# Patient Record
Sex: MALE | Race: BLACK OR AFRICAN AMERICAN | NOT HISPANIC OR LATINO | Employment: UNEMPLOYED | URBAN - METROPOLITAN AREA
[De-identification: names, ages, dates, MRNs, and addresses within clinical notes are randomized per-mention and may not be internally consistent; named-entity substitution may affect disease eponyms.]

---

## 2020-08-10 ENCOUNTER — OFFICE VISIT (OUTPATIENT)
Dept: INTERNAL MEDICINE | Facility: CLINIC | Age: 40
End: 2020-08-10
Payer: MEDICAID

## 2020-08-10 VITALS
DIASTOLIC BLOOD PRESSURE: 80 MMHG | HEIGHT: 72 IN | HEART RATE: 73 BPM | BODY MASS INDEX: 31.49 KG/M2 | OXYGEN SATURATION: 97 % | TEMPERATURE: 97 F | SYSTOLIC BLOOD PRESSURE: 123 MMHG | WEIGHT: 232.5 LBS

## 2020-08-10 DIAGNOSIS — Z00.00 LABORATORY EXAMINATION ORDERED AS PART OF A ROUTINE GENERAL MEDICAL EXAMINATION: ICD-10-CM

## 2020-08-10 DIAGNOSIS — F41.9 ANXIETY AND DEPRESSION: ICD-10-CM

## 2020-08-10 DIAGNOSIS — Z76.89 ENCOUNTER TO ESTABLISH CARE: Primary | ICD-10-CM

## 2020-08-10 DIAGNOSIS — F32.A ANXIETY AND DEPRESSION: ICD-10-CM

## 2020-08-10 LAB
ABS NRBC COUNT: 0 X 10 3/UL (ref 0–0.01)
ABSOLUTE BASOPHIL: 0.08 X 10 3/UL (ref 0–0.22)
ABSOLUTE EOSINOPHIL: 0.16 X 10 3/UL (ref 0.04–0.54)
ABSOLUTE LYMPHOCYTE: 4.03 X 10 3/UL (ref 0.86–4.75)
ABSOLUTE MONOCYTE: 1.13 X 10 3/UL (ref 0.22–1.08)
ALBUMIN SERPL-MCNC: 4.6 G/DL (ref 3.5–5.2)
ALBUMIN/GLOB SERPL ELPH: 1.7 {RATIO} (ref 1–2.7)
ALP ISOS SERPL LEV INH-CCNC: 28 U/L (ref 40–130)
ALT (SGPT): 26 U/L (ref 0–41)
ANION GAP SERPL CALC-SCNC: 9 MMOL/L (ref 8–17)
AST SERPL-CCNC: 20 U/L (ref 0–40)
BANDS: 1 % (ref 1–5)
BASOPHILS NFR BLD: 1 % (ref 0–1)
BILIRUBIN, TOTAL: 0.28 MG/DL (ref 0–1.2)
BUN/CREAT SERPL: 11.9 (ref 6–20)
CALCIUM SERPL-MCNC: 9.3 MG/DL (ref 8.6–10.2)
CARBON DIOXIDE, CO2: 26 MMOL/L (ref 22–29)
CHLORIDE: 105 MMOL/L (ref 98–107)
CHOLEST SERPL-MSCNC: 194 MG/DL (ref 100–200)
CREAT SERPL-MCNC: 1.04 MG/DL (ref 0.7–1.2)
EOSINOPHIL NFR BLD: 2 % (ref 1–7)
GFR ESTIMATION: 79.93
GLOBULIN: 2.7 G/DL (ref 1.5–4.5)
GLUCOSE: 99 MG/DL (ref 74–106)
HCT VFR BLD AUTO: 45.1 % (ref 42–52)
HDLC SERPL-MCNC: 48 MG/DL
HGB BLD-MCNC: 14.5 G/DL (ref 14–18)
LDL/HDL RATIO: 2.2 (ref 1–3)
LDLC SERPL CALC-MCNC: 106.8 MG/DL (ref 0–100)
LYMPHOCYTES NFR BLD: 46 % (ref 19–53)
MCH RBC QN AUTO: 32.1 PG (ref 27–32)
MCHC RBC AUTO-ENTMCNC: 32.2 G/DL (ref 32–36)
MCV RBC AUTO: 99.8 FL (ref 80–94)
MONOCYTES NFR BLD: 14 % (ref 5–13)
NEUTROPHILS ABSOLUTE COUNT: 2.66 X 10 3/UL (ref 2.32–6.7)
NUCLEATED RED BLOOD CELLS: 0 /100 WBC (ref 0–0.2)
PLATELET # BLD AUTO: 210 X 10 3/UL (ref 135–400)
POTASSIUM: 4.5 MMOL/L (ref 3.5–5.1)
PROT SNV-MCNC: 7.3 G/DL (ref 6.4–8.3)
RBC # BLD AUTO: 4.52 X 10 6/UL (ref 4.7–6.1)
RBC & PLT MORPHOLOGY: ABNORMAL
RDW-SD: 49.7 FL (ref 37–54)
REACTIVE LYMPHOCYTES: 4 %
SEGMENTERS: 32 % (ref 34–71)
SODIUM: 140 MMOL/L (ref 136–145)
TOTAL GRANULOCYTES: 2.9 X 10 3/UL
TRIGL SERPL-MCNC: 196 MG/DL (ref 0–150)
TSH W/REFLEX TO FT4: 2.54 UIU/ML (ref 0.27–4.2)
UREA NITROGEN (BUN): 12.4 MG/DL (ref 6–20)
WBC # BLD: 8.06 X 10 3/UL (ref 4.3–10.8)

## 2020-08-10 PROCEDURE — 99203 PR OFFICE/OUTPT VISIT, NEW, LEVL III, 30-44 MIN: ICD-10-PCS | Mod: S$GLB,,, | Performed by: NURSE PRACTITIONER

## 2020-08-10 PROCEDURE — 99203 OFFICE O/P NEW LOW 30 MIN: CPT | Mod: S$GLB,,, | Performed by: NURSE PRACTITIONER

## 2020-08-10 RX ORDER — BUPROPION HYDROCHLORIDE 150 MG/1
150 TABLET ORAL DAILY
Qty: 30 TABLET | Refills: 5 | Status: SHIPPED | OUTPATIENT
Start: 2020-08-10 | End: 2020-10-05 | Stop reason: DRUGHIGH

## 2020-08-10 NOTE — PROGRESS NOTES
Clinic Note  8/10/2020      Subjective:       Patient ID:  Luis is a 39 y.o. male being seen for a new visit.      Chief Complaint: Establish Care    HPI  Ra is a 39 year old male in clinic to  Establish care, has not ever followed PCP. Needs routine labs. States that he has dealt with anxiety and depression for years, seems to have increased in frequency and intensity. Never taken medication, no counseling. Denies any other health concerns.  Depression  Patient complains of depression. He complains of depressed mood, difficulty concentrating and insomnia. Onset was approximately several months ago. Symptoms have been gradually worsening since that time. Current symptoms include: insomnia. Patient denies suicidal attempt, suicidal thoughts with specific plan and suicidal thoughts without plan. Family history significant for unknown. Possible organic causes contributing are: endocrine/metabolic, nutritional. Risk factors: none. Previous treatment includes none. He complains of the following side effects from the treatment: N/A.    The following portions of the patient's history were reviewed and updated as appropriate: allergies, current medications, past family history, past medical history, past social history, past surgical history and problem list.      Family History   Problem Relation Age of Onset    Hypertension Mother     Hypertension Father      Social History     Socioeconomic History    Marital status:      Spouse name: Not on file    Number of children: Not on file    Years of education: Not on file    Highest education level: Not on file   Occupational History    Not on file   Social Needs    Financial resource strain: Not on file    Food insecurity     Worry: Not on file     Inability: Not on file    Transportation needs     Medical: Not on file     Non-medical: Not on file   Tobacco Use    Smoking status: Current Some Day Smoker    Smokeless tobacco: Never Used   Substance and  Sexual Activity    Alcohol use: Yes     Frequency: Never     Drinks per session: 1 or 2     Binge frequency: Never    Drug use: Never    Sexual activity: Not on file   Lifestyle    Physical activity     Days per week: Not on file     Minutes per session: Not on file    Stress: Not on file   Relationships    Social connections     Talks on phone: Not on file     Gets together: Not on file     Attends Sabianism service: Not on file     Active member of club or organization: Not on file     Attends meetings of clubs or organizations: Not on file     Relationship status: Not on file   Other Topics Concern    Not on file   Social History Narrative    Not on file     Past Surgical History:   Procedure Laterality Date    WISDOM TOOTH EXTRACTION       There is no problem list on file for this patient.      Review of Systems   Constitutional: Negative for chills, fever, malaise/fatigue and weight loss.   HENT: Negative for congestion, ear pain, sore throat and tinnitus.    Eyes: Negative for blurred vision, double vision, pain and discharge.   Respiratory: Negative for cough, sputum production, shortness of breath and wheezing.    Cardiovascular: Negative for chest pain, palpitations and leg swelling.   Gastrointestinal: Positive for heartburn. Negative for abdominal pain, constipation, diarrhea and vomiting.   Genitourinary: Negative for dysuria, frequency, hematuria and urgency.   Musculoskeletal: Negative for back pain, joint pain, myalgias and neck pain.   Neurological: Negative for dizziness, seizures, weakness and headaches.   Psychiatric/Behavioral: Positive for depression. Negative for suicidal ideas. The patient is nervous/anxious and has insomnia.              Objective:      /80 (BP Location: Left arm, Patient Position: Sitting, BP Method: Large (Automatic))   Pulse 73   Temp 96.8 °F (36 °C)   Ht 6' (1.829 m)   Wt 105.5 kg (232 lb 8 oz)   SpO2 97%   BMI 31.53 kg/m²   Estimated body mass index  is 31.53 kg/m² as calculated from the following:    Height as of this encounter: 6' (1.829 m).    Weight as of this encounter: 105.5 kg (232 lb 8 oz).  Physical Exam   Constitutional: He is oriented to person, place, and time and well-developed, well-nourished, and in no distress. Vital signs are normal. He appears not dehydrated. He appears healthy. He does not have a sickly appearance. No distress.   HENT:   Head: Normocephalic and atraumatic.   Right Ear: Hearing, tympanic membrane, external ear and ear canal normal.   Left Ear: Hearing and external ear normal. Tympanic membrane is perforated and erythematous.   Nose: Nose normal.   Mouth/Throat: Uvula is midline, oropharynx is clear and moist and mucous membranes are normal. Mucous membranes are not pale, not dry and not cyanotic. No oropharyngeal exudate or posterior oropharyngeal edema.   Eyes: Conjunctivae are normal. Right eye exhibits no discharge. Left eye exhibits no discharge. No scleral icterus.   Neck: Normal range of motion. Neck supple. No JVD present. No tracheal deviation present.   Cardiovascular: Normal rate, regular rhythm, S1 normal, S2 normal and normal heart sounds. Exam reveals no gallop, no distant heart sounds and no friction rub.   No murmur heard.  Pulmonary/Chest: Effort normal and breath sounds normal. No accessory muscle usage. No respiratory distress. He has no decreased breath sounds. He has no wheezes. He has no rhonchi.   Abdominal: Soft. Bowel sounds are normal. He exhibits no distension. There is no abdominal tenderness. There is no rebound and no guarding.   Musculoskeletal: Normal range of motion.         General: No deformity or edema.   Neurological: He is alert and oriented to person, place, and time. Gait normal. GCS score is 15.   Skin: Skin is warm, dry and intact. He is not diaphoretic. No pallor.   Psychiatric: Mood, memory, affect and judgment normal. His mood appears not anxious. His affect is not blunt and not  inappropriate. He is not agitated. He does not exhibit a depressed mood. He expresses no homicidal and no suicidal ideation. He expresses no suicidal plans and no homicidal plans. He exhibits ordered thought content and normal new learning ability.   Nursing note and vitals reviewed.        Mental Status Examination:  Posture and motor behavior: Appropriate  Dress, grooming, personal hygiene: Appropriate  Facial expression: Appropriate  Speech: Appropriate  Mood: Appropriate  Coherency and relevance of thought: Appropriate  Thought content: Appropriate  Perceptions: Appropriate  Orientation:Appropriate  Attention and concentration: Appropriate  Memory: : Appropriate  Information: Not examined  Vocabulary: Appropriate  Abstract reasoning: Appropriate  Judgment: Appropriate      Suicide Risk Assessment:  Suicidal intent:none  Suicidal plan: none  Access to means for suicide: unknown  Lethality of means for suicide: unknown  Prior suicide attempts: none  Recent exposure to suicide: none          Assessment and Plan:   Establish care  Routine lab, will call with results  Anxiety depression, chronic  Wellbutrin  mg daily  Follow up in 8 weeks to reassess medication and as needed      Problem List Items Addressed This Visit     None      Visit Diagnoses     Encounter to establish care    -  Primary    Laboratory examination ordered as part of a routine general medical examination        Relevant Orders    CBC auto differential    Comprehensive metabolic panel    Lipid Panel    TSH w/reflex to FT4    Anxiety and depression        Relevant Medications    buPROPion (WELLBUTRIN XL) 150 MG TB24 tablet          Risks, benefits, and alternatives discussed with patient, Patient verbalized understanding of discussed plan of care. Asked patient if any further questions, answered no.  Follow up:   No follow-ups on file.     Other Orders Placed This Visit:  Orders Placed This Encounter   Procedures    CBC auto differential      Standing Status:   Future     Number of Occurrences:   1     Standing Expiration Date:   10/9/2021    Comprehensive metabolic panel     Standing Status:   Future     Number of Occurrences:   1     Standing Expiration Date:   10/9/2021    Lipid Panel     Standing Status:   Future     Number of Occurrences:   1     Standing Expiration Date:   10/10/2021    TSH w/reflex to FT4     Standing Status:   Future     Number of Occurrences:   1     Standing Expiration Date:   10/9/2021           Manuela Toledo    Problem List Items Addressed This Visit     None      Visit Diagnoses     Encounter to establish care    -  Primary    Laboratory examination ordered as part of a routine general medical examination        Relevant Orders    CBC auto differential    Comprehensive metabolic panel    Lipid Panel    TSH w/reflex to FT4    Anxiety and depression        Relevant Medications    buPROPion (WELLBUTRIN XL) 150 MG TB24 tablet

## 2020-10-05 ENCOUNTER — OFFICE VISIT (OUTPATIENT)
Dept: PRIMARY CARE CLINIC | Facility: CLINIC | Age: 40
End: 2020-10-05
Payer: MEDICAID

## 2020-10-05 VITALS
HEART RATE: 98 BPM | HEIGHT: 72 IN | BODY MASS INDEX: 31.63 KG/M2 | DIASTOLIC BLOOD PRESSURE: 79 MMHG | SYSTOLIC BLOOD PRESSURE: 120 MMHG | WEIGHT: 233.5 LBS | OXYGEN SATURATION: 97 % | TEMPERATURE: 98 F

## 2020-10-05 DIAGNOSIS — F32.A DEPRESSION, UNSPECIFIED DEPRESSION TYPE: Primary | ICD-10-CM

## 2020-10-05 DIAGNOSIS — J06.9 URI WITH COUGH AND CONGESTION: ICD-10-CM

## 2020-10-05 PROCEDURE — 99213 PR OFFICE/OUTPT VISIT, EST, LEVL III, 20-29 MIN: ICD-10-PCS | Mod: S$GLB,,, | Performed by: NURSE PRACTITIONER

## 2020-10-05 PROCEDURE — 99213 OFFICE O/P EST LOW 20 MIN: CPT | Mod: S$GLB,,, | Performed by: NURSE PRACTITIONER

## 2020-10-05 RX ORDER — METHYLPREDNISOLONE 4 MG/1
TABLET ORAL
Qty: 1 PACKAGE | Refills: 0 | Status: SHIPPED | OUTPATIENT
Start: 2020-10-05 | End: 2020-10-26

## 2020-10-05 RX ORDER — AZITHROMYCIN 250 MG/1
TABLET, FILM COATED ORAL
Qty: 6 TABLET | Refills: 0 | Status: SHIPPED | OUTPATIENT
Start: 2020-10-05 | End: 2020-10-10

## 2020-10-05 RX ORDER — PROMETHAZINE HYDROCHLORIDE AND DEXTROMETHORPHAN HYDROBROMIDE 6.25; 15 MG/5ML; MG/5ML
5 SYRUP ORAL EVERY 8 HOURS PRN
Qty: 180 ML | Refills: 0 | Status: SHIPPED | OUTPATIENT
Start: 2020-10-05 | End: 2020-10-15

## 2020-10-05 RX ORDER — BUPROPION HYDROCHLORIDE 300 MG/1
300 TABLET ORAL DAILY
Qty: 30 TABLET | Refills: 11 | Status: SHIPPED | OUTPATIENT
Start: 2020-10-05 | End: 2022-04-12

## 2020-10-05 NOTE — PROGRESS NOTES
Clinic Note  10/5/2020      Subjective:       Patient ID:  Luis is a 39 y.o. male being seen for an established visit.    Chief Complaint: Follow-up (change medication dose) and Allergies    HPI   Ra is a 39 year old male in clinic for follow up. He states that he feels as though he could benefit from increasing his wellbutrin, states he experienced an anxiety attack that lasted for 24 hours.   Complains of sinus drainage with cough and congestion x 2weeks, tried OTC cough and cold medications with not results.  Depression with Anxiety  Patient complains of depression and anxiety. He complains of depressed mood, difficulty concentrating and insomnia. Onset was approximately several months ago. Symptoms have been gradually worsening since that time. Current symptoms include: insomnia. Patient denies suicidal attempt, suicidal thoughts with specific plan and suicidal thoughts without plan. Family history significant for unknown. Possible organic causes contributing are: endocrine/metabolic, nutritional. Risk factors: none. Previous treatment includes none. He complains of the following side effects from the treatment: N/A.     The following portions of the patient's history were reviewed and updated as appropriate: allergies, current medications, past family history, past medical history, past social history, past surgical history and problem list.    Review of Systems   Constitutional: Negative for chills and fever.   HENT: Positive for congestion. Negative for sore throat.    Respiratory: Positive for cough and sputum production. Negative for shortness of breath and wheezing.    Cardiovascular: Negative for chest pain and palpitations.   Gastrointestinal: Negative for constipation, diarrhea, nausea and vomiting.   Genitourinary: Negative for dysuria, frequency, hematuria and urgency.   Psychiatric/Behavioral: Positive for depression. The patient is nervous/anxious.        Medication List with Changes/Refills    New Medications    AZITHROMYCIN (Z-MAX) 250 MG TABLET    Take 2 tablets by mouth on day 1; Take 1 tablet by mouth on days 2-5    BUPROPION (WELLBUTRIN XL) 300 MG 24 HR TABLET    Take 1 tablet (300 mg total) by mouth once daily.    METHYLPREDNISOLONE (MEDROL DOSEPACK) 4 MG TABLET    use as directed    PROMETHAZINE-DEXTROMETHORPHAN (PROMETHAZINE-DM) 6.25-15 MG/5 ML SYRP    Take 5 mLs by mouth every 8 (eight) hours as needed.   Discontinued Medications    BUPROPION (WELLBUTRIN XL) 150 MG TB24 TABLET    Take 1 tablet (150 mg total) by mouth once daily.       There is no problem list on file for this patient.          Objective:      /79 (BP Location: Right arm, Patient Position: Sitting, BP Method: Medium (Manual))   Pulse 98   Temp 98.2 °F (36.8 °C)   Ht 6' (1.829 m)   Wt 105.9 kg (233 lb 8 oz)   SpO2 97%   BMI 31.67 kg/m²   Estimated body mass index is 31.67 kg/m² as calculated from the following:    Height as of this encounter: 6' (1.829 m).    Weight as of this encounter: 105.9 kg (233 lb 8 oz).  Physical Exam   Constitutional: He is oriented to person, place, and time and well-developed, well-nourished, and in no distress. No distress.   HENT:   Head: Normocephalic and atraumatic.   Mouth/Throat: No oropharyngeal exudate.   Cardiovascular: Normal rate, regular rhythm and normal heart sounds.   No murmur heard.  Pulmonary/Chest: Effort normal and breath sounds normal. No respiratory distress. He has no wheezes. He has no rales.   Neurological: He is alert and oriented to person, place, and time. Gait normal. GCS score is 15.   Skin: He is not diaphoretic.   Psychiatric: Mood, memory, affect and judgment normal. His mood appears not anxious. He does not exhibit a depressed mood. He expresses no homicidal and no suicidal ideation. He expresses no suicidal plans.   Nursing note and vitals reviewed.        Assessment and Plan:   URI, acute  Medrol pack  zpack if needed  Promethazine dm as  prescribed  Depression, anxiety  Increase wellbutrin xl to 300 mg  Follow up in 1 year and as needed      Problem List Items Addressed This Visit     None      Visit Diagnoses     URI with cough and congestion    -  Primary    Relevant Medications    methylPREDNISolone (MEDROL DOSEPACK) 4 mg tablet    azithromycin (Z-MAX) 250 MG tablet    promethazine-dextromethorphan (PROMETHAZINE-DM) 6.25-15 mg/5 mL Syrp    Depression, unspecified depression type        Relevant Medications    buPROPion (WELLBUTRIN XL) 300 MG 24 hr tablet            Risks, benefits, and alternatives discussed with patient, Patient verbalized understanding of discussed plan of care. Asked patient if any further questions, answered no.  Follow Up:   No follow-ups on file.    Other Orders Placed This Visit:  No orders of the defined types were placed in this encounter.        Manuela Toledo    Problem List Items Addressed This Visit     None      Visit Diagnoses     URI with cough and congestion    -  Primary    Relevant Medications    methylPREDNISolone (MEDROL DOSEPACK) 4 mg tablet    azithromycin (Z-MAX) 250 MG tablet    promethazine-dextromethorphan (PROMETHAZINE-DM) 6.25-15 mg/5 mL Syrp    Depression, unspecified depression type        Relevant Medications    buPROPion (WELLBUTRIN XL) 300 MG 24 hr tablet

## 2020-10-29 ENCOUNTER — TELEPHONE (OUTPATIENT)
Dept: FAMILY MEDICINE | Facility: CLINIC | Age: 40
End: 2020-10-29

## 2020-10-29 NOTE — TELEPHONE ENCOUNTER
----- Message from Candis Palma sent at 10/26/2020  2:13 PM CDT -----  Type:  Needs Medical Advice    Who Called: pt   Symptoms (please be specific): right elbow pain  heart burn   How long has patient had these symptoms:  today   Pharmacy name and phone #:    Would the patient rather a call back or a response via [facility]? Callback   Best Call Back Number: [phone]   Additional Information:

## 2020-11-03 ENCOUNTER — TELEPHONE (OUTPATIENT)
Dept: FAMILY MEDICINE | Facility: CLINIC | Age: 40
End: 2020-11-03

## 2020-11-09 ENCOUNTER — OFFICE VISIT (OUTPATIENT)
Dept: FAMILY MEDICINE | Facility: CLINIC | Age: 40
End: 2020-11-09
Payer: MEDICAID

## 2020-11-09 VITALS
TEMPERATURE: 98 F | BODY MASS INDEX: 32.05 KG/M2 | SYSTOLIC BLOOD PRESSURE: 129 MMHG | RESPIRATION RATE: 16 BRPM | OXYGEN SATURATION: 96 % | HEART RATE: 71 BPM | DIASTOLIC BLOOD PRESSURE: 83 MMHG | WEIGHT: 236.63 LBS | HEIGHT: 72 IN

## 2020-11-09 DIAGNOSIS — K21.9 GASTROESOPHAGEAL REFLUX DISEASE WITHOUT ESOPHAGITIS: ICD-10-CM

## 2020-11-09 DIAGNOSIS — M70.21 OLECRANON BURSITIS OF RIGHT ELBOW: Primary | ICD-10-CM

## 2020-11-09 PROCEDURE — 20605 PR DRAIN/INJECT INTERMEDIATE JOINT/BURSA: ICD-10-PCS | Mod: RT,S$GLB,, | Performed by: NURSE PRACTITIONER

## 2020-11-09 PROCEDURE — 99214 OFFICE O/P EST MOD 30 MIN: CPT | Mod: 25,S$GLB,, | Performed by: NURSE PRACTITIONER

## 2020-11-09 PROCEDURE — 20605 DRAIN/INJ JOINT/BURSA W/O US: CPT | Mod: RT,S$GLB,, | Performed by: NURSE PRACTITIONER

## 2020-11-09 PROCEDURE — 99214 PR OFFICE/OUTPT VISIT, EST, LEVL IV, 30-39 MIN: ICD-10-PCS | Mod: 25,S$GLB,, | Performed by: NURSE PRACTITIONER

## 2020-11-09 RX ORDER — PANTOPRAZOLE SODIUM 20 MG/1
20 TABLET, DELAYED RELEASE ORAL DAILY
Qty: 30 TABLET | Refills: 5 | Status: SHIPPED | OUTPATIENT
Start: 2020-11-09 | End: 2021-05-03

## 2020-11-09 NOTE — PROGRESS NOTES
Clinic Note  11/9/2020      Subjective:       Patient ID:  Luis is a 39 y.o. male being seen for an established visit.    Chief Complaint: Elbow Pain (pt states that about 2 weeks ago his right elbow started swelling and it is ines painful.  Pt states that it gets tight when he uses it too much. )    HPI      Luis Liu is a 39 y.o. male who presents with right elbow pain. Onset of the symptoms was 2 weeks ago. Inciting event: overuse, repetitive motion. Current symptoms include: swelling. Pain is aggravated by: nothing in particular. Symptoms have gradually worsened. Patient has had no prior elbow problems. Evaluation to date: none. Treatment to date: avoidance of offending activity and elbow compression sleeve which was not very effective.  Bursitis  Patient complains of bursitis. Patient has a 2 weeks history of swelling in the area of the right elbow. The patient reports that symptoms started after repetitive use.  Onset was sudden. The symptoms are of moderate and localized severity. They are made worse by: movement. They are helped by nothing. Previous treatments include OTC meds. Limitation on activities include elbow movement. Associated symptoms include none. Patient denies associated fatigue, fevers, joint pain and nausea.    The following portions of the patient's history were reviewed and updated as appropriate: allergies, current medications, past family history, past medical history, past social history, past surgical history and problem list.      Review of Systems   Constitutional: Negative for chills, diaphoresis and fever.   Musculoskeletal: Positive for joint swelling.        Right elbow          Medication List with Changes/Refills   New Medications    PANTOPRAZOLE (PROTONIX) 20 MG TABLET    Take 1 tablet (20 mg total) by mouth once daily.   Current Medications    BUPROPION (WELLBUTRIN XL) 300 MG 24 HR TABLET    Take 1 tablet (300 mg total) by mouth once daily.       Patient Active  Problem List   Diagnosis    Gastroesophageal reflux disease without esophagitis           Objective:      /83 (BP Location: Left arm, Patient Position: Sitting, BP Method: Large (Automatic))   Pulse 71   Temp 97.5 °F (36.4 °C) (Temporal)   Resp 16   Ht 6' (1.829 m)   Wt 107.3 kg (236 lb 9.6 oz)   SpO2 96%   BMI 32.09 kg/m²   Estimated body mass index is 32.09 kg/m² as calculated from the following:    Height as of this encounter: 6' (1.829 m).    Weight as of this encounter: 107.3 kg (236 lb 9.6 oz).    Physical Exam   Constitutional: He is oriented to person, place, and time. He appears well-developed and well-nourished. No distress.   Pulmonary/Chest: No respiratory distress.   Musculoskeletal:         General: Tenderness and edema present.      Right shoulder: He exhibits normal range of motion, no tenderness and no swelling.      Right elbow: He exhibits decreased range of motion and swelling. Tenderness found. Olecranon process tenderness noted.   Neurological: He is alert and oriented to person, place, and time.   Skin: He is not diaphoretic.   Psychiatric: He has a normal mood and affect. His behavior is normal. Judgment and thought content normal.   Nursing note and vitals reviewed.     Bursa Aspiration without Injection Procedure Note    Pre-operative Diagnosis: right Olecranon bursitis    Post-operative Diagnosis: same    Indications: Diagnosis and treatment of symptomatic bursal effusion    Anesthesia: Lidocaine 1% without epinephrine     Procedure Details     After a discussion of the risks and benefits with the patient (including the possibility that any manipulation of the bursa could introduce infection, worsening the current situation significantly), verbal consent was obtained for the procedure. The joint was prepped with Betadine and a small wheel of local anesthesia was injected into the subcutaneous tissue. An 18 gauge needle was introduced into the fluid collection and 10 ml of  serosanguinous fluid was removed and sent to the lab for analysis. No corticosteroids were given. The injection site was cleansed with topical isopropyl alcohol and a dressing was applied.    Complications:  None; patient tolerated the procedure well.          Assessment and Plan:   Olecranon bursitis, acute      Plan:     Labs:  C&S of fluid  Procedures: Bursa aspiration and injection indicated. See separate procedure note.  Discussion and guidance: Recommended rest for painful and swollen joints, Recommended regular exercise program once pain and swelling is dec.         Problem List Items Addressed This Visit        GI    Gastroesophageal reflux disease without esophagitis    Relevant Medications    pantoprazole (PROTONIX) 20 MG tablet      Other Visit Diagnoses     Olecranon bursitis of right elbow    -  Primary    Relevant Orders    Culture, Body Fluid (Aerobic) w/ GS            Risks, benefits, and alternatives discussed with patient, Patient verbalized understanding of discussed plan of care. Asked patient if any further questions, answered no.  Follow Up:   Follow up if symptoms worsen or fail to improve.    Other Orders Placed This Visit:  Orders Placed This Encounter   Procedures    Culture, Body Fluid (Aerobic) w/ GS         Manuela Toledo    Problem List Items Addressed This Visit        GI    Gastroesophageal reflux disease without esophagitis    Relevant Medications    pantoprazole (PROTONIX) 20 MG tablet      Other Visit Diagnoses     Olecranon bursitis of right elbow    -  Primary    Relevant Orders    Culture, Body Fluid (Aerobic) w/ GS

## 2020-11-10 DIAGNOSIS — M70.21 OLECRANON BURSITIS OF RIGHT ELBOW: Primary | ICD-10-CM

## 2020-11-10 RX ORDER — PREDNISONE 10 MG/1
TABLET ORAL
Qty: 30 TABLET | Refills: 0 | Status: SHIPPED | OUTPATIENT
Start: 2020-11-10 | End: 2020-11-20 | Stop reason: SDUPTHER

## 2020-11-12 LAB — CULTURE: NORMAL

## 2020-11-19 NOTE — PROGRESS NOTES
Clinic Note  11/20/2020      Subjective:       Patient ID:  Luis is a 39 y.o. male being seen for an established visit.    Chief Complaint: Elbow Pain (pt is here to get his right elbow drained again.)    HPI  Luis Liu is a 39 y.o. male who presents with right elbow olecranon bursitis. Onset of the symptoms was 3 weeks ago. On 11/09/2020 had elbow drained in clinic, cultures negative.Inciting event: overuse, repetitive motion. Current symptoms include: swelling. Denies pain today. States that he forgot and was boxing, swelling increased. Symptoms have gradually worsened. Patient has had no prior elbow problems. Evaluation to date: none. Treatment to date: avoidance of offending activity and elbow compression sleeve which was not very effective.  Bursitis  Patient complains of bursitis. Patient has a 3 weeks history of swelling in the area of the right elbow. The patient reports that symptoms started after repetitive use.  Onset was sudden. The symptoms are of moderate and localized severity. They are made worse by: movement. They are helped by nothing. Previous treatments include OTC meds. Limitation on activities include elbow movement. Associated symptoms include none. Patient denies associated fatigue, fevers, joint pain and nausea.    The following portions of the patient's history were reviewed and updated as appropriate: allergies, current medications, past family history, past medical history, past social history, past surgical history and problem list.      Review of Systems   Constitutional: Negative for chills, diaphoresis and fever.   Musculoskeletal: Positive for joint swelling.        Right elbow       Medication List with Changes/Refills   Current Medications    BUPROPION (WELLBUTRIN XL) 300 MG 24 HR TABLET    Take 1 tablet (300 mg total) by mouth once daily.    CLINDAMYCIN (CLEOCIN) 300 MG CAPSULE    Take 2 capsules by mouth 2 (two) times a day.    PANTOPRAZOLE (PROTONIX) 20 MG TABLET     Take 1 tablet (20 mg total) by mouth once daily.   Changed and/or Refilled Medications    Modified Medication Previous Medication    PREDNISONE (DELTASONE) 10 MG TABLET predniSONE (DELTASONE) 10 MG tablet       Take 2 tables twice a day for 7 days, then 1 tablet twice a day for 7 days    Take 2 tables twice a day for 5 days, then 1 tablet twice a day for 5 days       Patient Active Problem List   Diagnosis    Gastroesophageal reflux disease without esophagitis           Objective:      /82 (BP Location: Left arm, Patient Position: Sitting, BP Method: Large (Automatic))   Pulse 84   Temp 97.6 °F (36.4 °C) (Temporal)   Resp 16   Ht 6' (1.829 m)   Wt 107 kg (235 lb 12.8 oz)   SpO2 99%   BMI 31.98 kg/m²   Estimated body mass index is 31.98 kg/m² as calculated from the following:    Height as of this encounter: 6' (1.829 m).    Weight as of this encounter: 107 kg (235 lb 12.8 oz).    Physical Exam   Constitutional: He is oriented to person, place, and time. He appears well-developed and well-nourished. No distress.   Pulmonary/Chest: No respiratory distress.   Musculoskeletal:         General: Tenderness and edema present.      Right shoulder: He exhibits normal range of motion, no tenderness and no swelling.      Right elbow: He exhibits decreased range of motion and swelling. Tenderness found. Olecranon process tenderness noted.   Neurological: He is alert and oriented to person, place, and time.   Skin: He is not diaphoretic.   Psychiatric: He has a normal mood and affect. His behavior is normal. Judgment and thought content normal.   Nursing note and vitals reviewed.    Bursa Aspiration without Injection Procedure Note     Pre-operative Diagnosis: right Olecranon bursitis     Post-operative Diagnosis: same     Indications: Diagnosis and treatment of symptomatic bursal effusion     Anesthesia: 1 cc Lidocaine 1% without epinephrine      Procedure Details      After a discussion of the risks and benefits  with the patient (including the possibility that any manipulation of the bursa could introduce infection, worsening the current situation significantly), verbal consent was obtained for the procedure. The joint was prepped with Betadine and a small wheel of local anesthesia was injected into the subcutaneous tissue. An 18 gauge needle was introduced into the fluid collection and 10 ml of serosanguinous fluid was removed. No corticosteroids were given. The injection site was cleansed with topical isopropyl alcohol and a pressure dressing was applied.     Complications:  None; patient tolerated the procedure well.            Assessment and Plan:   Olecranon bursitis, acute      Plan:   Procedures: Bursa aspiration and injection indicated. See separate procedure note.  Discussion and guidance: Recommended rest for painful and swollen joints, Recommended regular exercise program once pain and swelling is decreased.   Medication: Prednisone x 14 days         Problem List Items Addressed This Visit     None      Visit Diagnoses     Olecranon bursitis of right elbow        Relevant Medications    predniSONE (DELTASONE) 10 MG tablet            Risks, benefits, and alternatives discussed with patient, Patient verbalized understanding of discussed plan of care. Asked patient if any further questions, answered no.  Follow Up:   No follow-ups on file.    Other Orders Placed This Visit:  No orders of the defined types were placed in this encounter.        Manuela Toledo    Problem List Items Addressed This Visit     None      Visit Diagnoses     Olecranon bursitis of right elbow        Relevant Medications    predniSONE (DELTASONE) 10 MG tablet

## 2020-11-20 ENCOUNTER — OFFICE VISIT (OUTPATIENT)
Dept: FAMILY MEDICINE | Facility: CLINIC | Age: 40
End: 2020-11-20
Payer: MEDICAID

## 2020-11-20 VITALS
DIASTOLIC BLOOD PRESSURE: 82 MMHG | SYSTOLIC BLOOD PRESSURE: 128 MMHG | HEIGHT: 72 IN | OXYGEN SATURATION: 99 % | TEMPERATURE: 98 F | RESPIRATION RATE: 16 BRPM | WEIGHT: 235.81 LBS | BODY MASS INDEX: 31.94 KG/M2 | HEART RATE: 84 BPM

## 2020-11-20 DIAGNOSIS — M70.21 OLECRANON BURSITIS OF RIGHT ELBOW: ICD-10-CM

## 2020-11-20 PROCEDURE — 99213 PR OFFICE/OUTPT VISIT, EST, LEVL III, 20-29 MIN: ICD-10-PCS | Mod: S$GLB,,, | Performed by: NURSE PRACTITIONER

## 2020-11-20 PROCEDURE — 99213 OFFICE O/P EST LOW 20 MIN: CPT | Mod: S$GLB,,, | Performed by: NURSE PRACTITIONER

## 2020-11-20 RX ORDER — PREDNISONE 10 MG/1
TABLET ORAL
Qty: 21 TABLET | Refills: 0 | Status: SHIPPED | OUTPATIENT
Start: 2020-11-20 | End: 2021-03-31

## 2020-11-20 RX ORDER — CLINDAMYCIN HYDROCHLORIDE 300 MG/1
2 CAPSULE ORAL 2 TIMES DAILY
COMMUNITY
Start: 2020-11-12 | End: 2021-03-31

## 2020-12-09 DIAGNOSIS — M70.21 OLECRANON BURSITIS OF RIGHT ELBOW: Primary | ICD-10-CM

## 2020-12-28 ENCOUNTER — OFFICE VISIT (OUTPATIENT)
Dept: ORTHOPEDICS | Facility: CLINIC | Age: 40
End: 2020-12-28
Payer: MEDICAID

## 2020-12-28 VITALS — BODY MASS INDEX: 33.6 KG/M2 | HEIGHT: 71 IN | WEIGHT: 240 LBS | TEMPERATURE: 98 F

## 2020-12-28 DIAGNOSIS — M70.21 OLECRANON BURSITIS OF RIGHT ELBOW: ICD-10-CM

## 2020-12-28 PROCEDURE — 20605 DRAIN/INJ JOINT/BURSA W/O US: CPT | Mod: RT,S$GLB,, | Performed by: ORTHOPAEDIC SURGERY

## 2020-12-28 PROCEDURE — 99202 PR OFFICE/OUTPT VISIT, NEW, LEVL II, 15-29 MIN: ICD-10-PCS | Mod: 25,S$GLB,, | Performed by: ORTHOPAEDIC SURGERY

## 2020-12-28 PROCEDURE — 20605 INTERMEDIATE JOINT ASPIRATION/INJECTION: R OLECRANON BURSA: ICD-10-PCS | Mod: RT,S$GLB,, | Performed by: ORTHOPAEDIC SURGERY

## 2020-12-28 PROCEDURE — 99202 OFFICE O/P NEW SF 15 MIN: CPT | Mod: 25,S$GLB,, | Performed by: ORTHOPAEDIC SURGERY

## 2020-12-28 NOTE — PROCEDURES
Intermediate Joint Aspiration/Injection: R olecranon bursa    Date/Time: 12/28/2020 9:00 AM  Performed by: Chilango Miranda MD  Authorized by: Chilango Miranda MD     Consent Done?:  Yes (Verbal)    Location:  Elbow  Site:  R olecranon bursa  Prep: Patient was prepped and draped in usual sterile fashion    Needle size:  18 G  Approach:  Posterior  Medications:  10 mg triamcinolone acetonide 10 mg/mL  Patient tolerance:  Patient tolerated the procedure well with no immediate complications

## 2020-12-28 NOTE — LETTER
December 28, 2020      Manuela Toledo NP  [address]  [address]  [facility] [address]           [facility]  [address]  [place] [address]  Phone: [phone]  Fax: [phone]          Patient: Luis Liu   MR Number: [id number]   YOB: 1980   Date of Visit: 12/28/2020       Dear Manuela Toledo:    Thank you for referring Luis Liu to me for evaluation. Attached you will find relevant portions of my assessment and plan of care.    If you have questions, please do not hesitate to call me. I look forward to following Luis Liu along with you.    Sincerely,    Chilango Miranda MD    Enclosure  CC:  No Recipients    If you would like to receive this communication electronically, please contact [email or web address] or [phone] to request more information on [employer] access.    For providers and/or their staff who would like to refer a patient to [facility], please contact us through our one-stop-shop provider referral line, [facility], at [phone].    If you feel you have received this communication in error or would no longer like to receive these types of communications, please e-mail [email or web address]

## 2020-12-28 NOTE — PROGRESS NOTES
Subjective:      Patient ID: Luis Liu is a 40 y.o. male.    Chief Complaint: Pain of the Right Elbow    HPI 40-year-old man with a several week history of right elbow swelling.  The olecranon bursa has been aspirated twice by his primary care provider.    Review of Systems   Constitution: Negative for fever and weight loss.   Cardiovascular: Negative for chest pain and leg swelling.   Musculoskeletal: Negative for arthritis, joint pain, joint swelling, muscle weakness and stiffness.   Gastrointestinal: Negative for change in bowel habit.   Genitourinary: Negative for bladder incontinence and hematuria.   Neurological: Negative for focal weakness, numbness, paresthesias and sensory change.         Objective:      Active and passive range of motion of the right elbow is normal.  He has palpable thickening the olecranon bursa with rice bodies.      Ortho/SPM Exam            Assessment:       Encounter Diagnosis   Name Primary?    Olecranon bursitis of right elbow           Plan:       Luis was seen today for pain.    Diagnoses and all orders for this visit:    Olecranon bursitis of right elbow  -     Ambulatory referral/consult to Orthopedics     The elbow is aspirated of 5 cc of blood-tinged fluid.  0.5 cc of Kenalog 10 is instilled.  He is placed in a compression wrap and is to take 2 Aleve twice a day for the next 5 days.  Return 2 weeks p.r.n.

## 2021-01-28 ENCOUNTER — PATIENT MESSAGE (OUTPATIENT)
Dept: FAMILY MEDICINE | Facility: CLINIC | Age: 41
End: 2021-01-28

## 2021-02-02 ENCOUNTER — TELEPHONE (OUTPATIENT)
Dept: ORTHOPEDICS | Facility: CLINIC | Age: 41
End: 2021-02-02

## 2021-02-20 ENCOUNTER — PATIENT MESSAGE (OUTPATIENT)
Dept: FAMILY MEDICINE | Facility: CLINIC | Age: 41
End: 2021-02-20

## 2021-03-26 ENCOUNTER — PATIENT MESSAGE (OUTPATIENT)
Dept: FAMILY MEDICINE | Facility: CLINIC | Age: 41
End: 2021-03-26

## 2021-03-31 ENCOUNTER — OFFICE VISIT (OUTPATIENT)
Dept: FAMILY MEDICINE | Facility: CLINIC | Age: 41
End: 2021-03-31
Payer: MEDICAID

## 2021-03-31 VITALS
SYSTOLIC BLOOD PRESSURE: 137 MMHG | OXYGEN SATURATION: 98 % | TEMPERATURE: 98 F | HEART RATE: 79 BPM | WEIGHT: 236 LBS | BODY MASS INDEX: 33.04 KG/M2 | DIASTOLIC BLOOD PRESSURE: 84 MMHG | HEIGHT: 71 IN | RESPIRATION RATE: 20 BRPM

## 2021-03-31 DIAGNOSIS — H60.93 OTITIS EXTERNA OF BOTH EARS, UNSPECIFIED CHRONICITY, UNSPECIFIED TYPE: Primary | ICD-10-CM

## 2021-03-31 PROCEDURE — 99213 OFFICE O/P EST LOW 20 MIN: CPT | Mod: S$GLB,,, | Performed by: NURSE PRACTITIONER

## 2021-03-31 PROCEDURE — 99213 PR OFFICE/OUTPT VISIT, EST, LEVL III, 20-29 MIN: ICD-10-PCS | Mod: S$GLB,,, | Performed by: NURSE PRACTITIONER

## 2021-03-31 RX ORDER — CIPROFLOXACIN AND DEXAMETHASONE 3; 1 MG/ML; MG/ML
4 SUSPENSION/ DROPS AURICULAR (OTIC) 2 TIMES DAILY
Qty: 7.5 ML | Refills: 0 | Status: SHIPPED | OUTPATIENT
Start: 2021-03-31 | End: 2021-04-16

## 2021-04-07 ENCOUNTER — PATIENT MESSAGE (OUTPATIENT)
Dept: FAMILY MEDICINE | Facility: CLINIC | Age: 41
End: 2021-04-07

## 2021-04-07 DIAGNOSIS — H60.93 OTITIS EXTERNA OF BOTH EARS, UNSPECIFIED CHRONICITY, UNSPECIFIED TYPE: Primary | ICD-10-CM

## 2021-04-07 RX ORDER — AMOXICILLIN AND CLAVULANATE POTASSIUM 875; 125 MG/1; MG/1
1 TABLET, FILM COATED ORAL EVERY 12 HOURS
Qty: 14 TABLET | Refills: 0 | Status: SHIPPED | OUTPATIENT
Start: 2021-04-07 | End: 2021-04-14

## 2021-04-16 ENCOUNTER — OFFICE VISIT (OUTPATIENT)
Dept: FAMILY MEDICINE | Facility: CLINIC | Age: 41
End: 2021-04-16
Payer: MEDICAID

## 2021-04-16 VITALS
OXYGEN SATURATION: 95 % | WEIGHT: 232 LBS | TEMPERATURE: 98 F | BODY MASS INDEX: 32.48 KG/M2 | HEART RATE: 84 BPM | RESPIRATION RATE: 16 BRPM | HEIGHT: 71 IN | SYSTOLIC BLOOD PRESSURE: 129 MMHG | DIASTOLIC BLOOD PRESSURE: 85 MMHG

## 2021-04-16 DIAGNOSIS — H60.503 ACUTE OTITIS EXTERNA OF BOTH EARS, UNSPECIFIED TYPE: Primary | ICD-10-CM

## 2021-04-16 PROCEDURE — 99213 PR OFFICE/OUTPT VISIT, EST, LEVL III, 20-29 MIN: ICD-10-PCS | Mod: S$GLB,,, | Performed by: NURSE PRACTITIONER

## 2021-04-16 PROCEDURE — 99213 OFFICE O/P EST LOW 20 MIN: CPT | Mod: S$GLB,,, | Performed by: NURSE PRACTITIONER

## 2021-04-16 RX ORDER — NEOMYCIN SULFATE, POLYMYXIN B SULFATE, HYDROCORTISONE 3.5; 10000; 1 MG/ML; [USP'U]/ML; MG/ML
3 SOLUTION/ DROPS AURICULAR (OTIC) 3 TIMES DAILY
Qty: 10 ML | Refills: 0 | Status: SHIPPED | OUTPATIENT
Start: 2021-04-16 | End: 2021-04-26

## 2021-04-16 RX ORDER — METHYLPREDNISOLONE 4 MG/1
TABLET ORAL
Qty: 1 PACKAGE | Refills: 0 | Status: SHIPPED | OUTPATIENT
Start: 2021-04-16 | End: 2021-05-07

## 2021-04-16 RX ORDER — AZITHROMYCIN 250 MG/1
TABLET, FILM COATED ORAL
Qty: 6 TABLET | Refills: 0 | Status: SHIPPED | OUTPATIENT
Start: 2021-04-16 | End: 2021-04-21

## 2021-05-03 DIAGNOSIS — K21.9 GASTROESOPHAGEAL REFLUX DISEASE WITHOUT ESOPHAGITIS: ICD-10-CM

## 2021-05-03 RX ORDER — PANTOPRAZOLE SODIUM 20 MG/1
TABLET, DELAYED RELEASE ORAL
Qty: 30 TABLET | Refills: 5 | Status: SHIPPED | OUTPATIENT
Start: 2021-05-03 | End: 2022-10-04 | Stop reason: SDUPTHER

## 2021-05-26 ENCOUNTER — TELEPHONE (OUTPATIENT)
Dept: ORTHOPEDICS | Facility: CLINIC | Age: 41
End: 2021-05-26

## 2021-05-27 ENCOUNTER — OFFICE VISIT (OUTPATIENT)
Dept: ORTHOPEDICS | Facility: CLINIC | Age: 41
End: 2021-05-27
Payer: COMMERCIAL

## 2021-05-27 DIAGNOSIS — R22.31 LOCALIZED SWELLING, MASS, OR LUMP OF RIGHT UPPER EXTREMITY: ICD-10-CM

## 2021-05-27 DIAGNOSIS — G56.31 RADIAL NEUROPATHY, RIGHT: Primary | ICD-10-CM

## 2021-05-27 PROCEDURE — 99213 OFFICE O/P EST LOW 20 MIN: CPT | Mod: S$GLB,,, | Performed by: ORTHOPAEDIC SURGERY

## 2021-05-27 PROCEDURE — 99213 PR OFFICE/OUTPT VISIT, EST, LEVL III, 20-29 MIN: ICD-10-PCS | Mod: S$GLB,,, | Performed by: ORTHOPAEDIC SURGERY

## 2021-06-02 ENCOUNTER — PATIENT MESSAGE (OUTPATIENT)
Dept: ORTHOPEDICS | Facility: CLINIC | Age: 41
End: 2021-06-02

## 2021-06-10 ENCOUNTER — OFFICE VISIT (OUTPATIENT)
Dept: ORTHOPEDICS | Facility: CLINIC | Age: 41
End: 2021-06-10
Payer: COMMERCIAL

## 2021-06-10 DIAGNOSIS — M77.11 LATERAL EPICONDYLITIS OF RIGHT ELBOW: Primary | ICD-10-CM

## 2021-06-10 PROCEDURE — 20551 TENDON ORIGIN: ICD-10-PCS | Mod: RT,S$GLB,, | Performed by: ORTHOPAEDIC SURGERY

## 2021-06-10 PROCEDURE — 99499 UNLISTED E&M SERVICE: CPT | Mod: S$GLB,,, | Performed by: ORTHOPAEDIC SURGERY

## 2021-06-10 PROCEDURE — 20551 NJX 1 TENDON ORIGIN/INSJ: CPT | Mod: RT,S$GLB,, | Performed by: ORTHOPAEDIC SURGERY

## 2021-06-10 PROCEDURE — 99499 NO LOS: ICD-10-PCS | Mod: S$GLB,,, | Performed by: ORTHOPAEDIC SURGERY

## 2021-09-27 ENCOUNTER — OFFICE VISIT (OUTPATIENT)
Dept: ORTHOPEDICS | Facility: CLINIC | Age: 41
End: 2021-09-27
Payer: COMMERCIAL

## 2021-09-27 DIAGNOSIS — M70.22 OLECRANON BURSITIS OF LEFT ELBOW: Primary | ICD-10-CM

## 2021-09-27 PROCEDURE — 1160F RVW MEDS BY RX/DR IN RCRD: CPT | Mod: CPTII,S$GLB,, | Performed by: ORTHOPAEDIC SURGERY

## 2021-09-27 PROCEDURE — 99213 OFFICE O/P EST LOW 20 MIN: CPT | Mod: 25,S$GLB,, | Performed by: ORTHOPAEDIC SURGERY

## 2021-09-27 PROCEDURE — 1159F MED LIST DOCD IN RCRD: CPT | Mod: CPTII,S$GLB,, | Performed by: ORTHOPAEDIC SURGERY

## 2021-09-27 PROCEDURE — 20605 INTERMEDIATE JOINT ASPIRATION/INJECTION: L OLECRANON BURSA: ICD-10-PCS | Mod: LT,S$GLB,, | Performed by: ORTHOPAEDIC SURGERY

## 2021-09-27 PROCEDURE — 1160F PR REVIEW ALL MEDS BY PRESCRIBER/CLIN PHARMACIST DOCUMENTED: ICD-10-PCS | Mod: CPTII,S$GLB,, | Performed by: ORTHOPAEDIC SURGERY

## 2021-09-27 PROCEDURE — 20605 DRAIN/INJ JOINT/BURSA W/O US: CPT | Mod: LT,S$GLB,, | Performed by: ORTHOPAEDIC SURGERY

## 2021-09-27 PROCEDURE — 99213 PR OFFICE/OUTPT VISIT, EST, LEVL III, 20-29 MIN: ICD-10-PCS | Mod: 25,S$GLB,, | Performed by: ORTHOPAEDIC SURGERY

## 2021-09-27 PROCEDURE — 1159F PR MEDICATION LIST DOCUMENTED IN MEDICAL RECORD: ICD-10-PCS | Mod: CPTII,S$GLB,, | Performed by: ORTHOPAEDIC SURGERY

## 2021-09-27 RX ORDER — AZELASTINE 1 MG/ML
1 SPRAY, METERED NASAL 2 TIMES DAILY
COMMUNITY
Start: 2021-08-06 | End: 2021-10-20

## 2021-09-27 RX ORDER — TRIAMCINOLONE ACETONIDE 0.25 MG/G
CREAM TOPICAL
COMMUNITY
Start: 2021-08-06 | End: 2021-10-20 | Stop reason: SDUPTHER

## 2021-09-27 RX ORDER — DEXBROMPHENIRAMINE MALEATE, PHENYLEPHRINE HYDROCHLORIDE 2; 7.5 MG/1; MG/1
TABLET ORAL
COMMUNITY
Start: 2021-08-06 | End: 2021-10-20

## 2021-09-27 RX ORDER — PROMETHAZINE HYDROCHLORIDE AND DEXTROMETHORPHAN HYDROBROMIDE 6.25; 15 MG/5ML; MG/5ML
SYRUP ORAL
COMMUNITY
Start: 2021-08-28 | End: 2021-10-20

## 2021-09-27 RX ORDER — TRIAMCINOLONE ACETONIDE 1 MG/G
CREAM TOPICAL
COMMUNITY
Start: 2021-08-18 | End: 2022-10-04 | Stop reason: SDUPTHER

## 2021-09-27 RX ORDER — HYDROXYZINE HYDROCHLORIDE 25 MG/1
TABLET, FILM COATED ORAL
COMMUNITY
Start: 2021-08-18 | End: 2021-10-20

## 2021-10-08 ENCOUNTER — TELEPHONE (OUTPATIENT)
Dept: ORTHOPEDICS | Facility: CLINIC | Age: 41
End: 2021-10-08

## 2021-10-11 ENCOUNTER — OFFICE VISIT (OUTPATIENT)
Dept: ORTHOPEDICS | Facility: CLINIC | Age: 41
End: 2021-10-11
Payer: COMMERCIAL

## 2021-10-11 DIAGNOSIS — M70.22 OLECRANON BURSITIS OF LEFT ELBOW: Primary | ICD-10-CM

## 2021-10-11 PROCEDURE — 99213 PR OFFICE/OUTPT VISIT, EST, LEVL III, 20-29 MIN: ICD-10-PCS | Mod: 25,S$GLB,, | Performed by: ORTHOPAEDIC SURGERY

## 2021-10-11 PROCEDURE — 20605 DRAIN/INJ JOINT/BURSA W/O US: CPT | Mod: LT,S$GLB,, | Performed by: ORTHOPAEDIC SURGERY

## 2021-10-11 PROCEDURE — 99213 OFFICE O/P EST LOW 20 MIN: CPT | Mod: 25,S$GLB,, | Performed by: ORTHOPAEDIC SURGERY

## 2021-10-11 PROCEDURE — 20605 INTERMEDIATE JOINT ASPIRATION/INJECTION: L OLECRANON BURSA: ICD-10-PCS | Mod: LT,S$GLB,, | Performed by: ORTHOPAEDIC SURGERY

## 2021-10-11 RX ORDER — INDOMETHACIN 25 MG/1
25 CAPSULE ORAL 2 TIMES DAILY
COMMUNITY
Start: 2021-09-27 | End: 2021-10-20

## 2021-10-19 ENCOUNTER — TELEPHONE (OUTPATIENT)
Dept: ORTHOPEDICS | Facility: CLINIC | Age: 41
End: 2021-10-19

## 2021-10-20 ENCOUNTER — OFFICE VISIT (OUTPATIENT)
Dept: FAMILY MEDICINE | Facility: CLINIC | Age: 41
End: 2021-10-20
Payer: COMMERCIAL

## 2021-10-20 VITALS
HEART RATE: 82 BPM | BODY MASS INDEX: 33.04 KG/M2 | SYSTOLIC BLOOD PRESSURE: 129 MMHG | RESPIRATION RATE: 16 BRPM | DIASTOLIC BLOOD PRESSURE: 86 MMHG | WEIGHT: 236 LBS | TEMPERATURE: 99 F | OXYGEN SATURATION: 98 % | HEIGHT: 71 IN

## 2021-10-20 DIAGNOSIS — Z13.0 SCREENING FOR ENDOCRINE, NUTRITIONAL, METABOLIC AND IMMUNITY DISORDER: ICD-10-CM

## 2021-10-20 DIAGNOSIS — Z13.220 ENCOUNTER FOR LIPID SCREENING FOR CARDIOVASCULAR DISEASE: ICD-10-CM

## 2021-10-20 DIAGNOSIS — Z11.59 ENCOUNTER FOR SCREENING FOR OTHER VIRAL DISEASES: ICD-10-CM

## 2021-10-20 DIAGNOSIS — Z13.29 SCREENING FOR ENDOCRINE, NUTRITIONAL, METABOLIC AND IMMUNITY DISORDER: ICD-10-CM

## 2021-10-20 DIAGNOSIS — F32.A ANXIETY AND DEPRESSION: ICD-10-CM

## 2021-10-20 DIAGNOSIS — Z13.21 SCREENING FOR ENDOCRINE, NUTRITIONAL, METABOLIC AND IMMUNITY DISORDER: ICD-10-CM

## 2021-10-20 DIAGNOSIS — F41.9 ANXIETY AND DEPRESSION: ICD-10-CM

## 2021-10-20 DIAGNOSIS — Z13.6 ENCOUNTER FOR LIPID SCREENING FOR CARDIOVASCULAR DISEASE: ICD-10-CM

## 2021-10-20 DIAGNOSIS — Z13.228 SCREENING FOR ENDOCRINE, NUTRITIONAL, METABOLIC AND IMMUNITY DISORDER: ICD-10-CM

## 2021-10-20 DIAGNOSIS — Z76.89 ENCOUNTER TO ESTABLISH CARE: Primary | ICD-10-CM

## 2021-10-20 DIAGNOSIS — Z80.0 FAMILY HISTORY OF COLON CANCER: ICD-10-CM

## 2021-10-20 PROCEDURE — 99214 PR OFFICE/OUTPT VISIT, EST, LEVL IV, 30-39 MIN: ICD-10-PCS | Mod: S$GLB,,, | Performed by: OBSTETRICS & GYNECOLOGY

## 2021-10-20 PROCEDURE — 3074F PR MOST RECENT SYSTOLIC BLOOD PRESSURE < 130 MM HG: ICD-10-PCS | Mod: CPTII,S$GLB,, | Performed by: OBSTETRICS & GYNECOLOGY

## 2021-10-20 PROCEDURE — 1159F PR MEDICATION LIST DOCUMENTED IN MEDICAL RECORD: ICD-10-PCS | Mod: CPTII,S$GLB,, | Performed by: OBSTETRICS & GYNECOLOGY

## 2021-10-20 PROCEDURE — 1159F MED LIST DOCD IN RCRD: CPT | Mod: CPTII,S$GLB,, | Performed by: OBSTETRICS & GYNECOLOGY

## 2021-10-20 PROCEDURE — 3074F SYST BP LT 130 MM HG: CPT | Mod: CPTII,S$GLB,, | Performed by: OBSTETRICS & GYNECOLOGY

## 2021-10-20 PROCEDURE — 3008F BODY MASS INDEX DOCD: CPT | Mod: CPTII,S$GLB,, | Performed by: OBSTETRICS & GYNECOLOGY

## 2021-10-20 PROCEDURE — 1160F RVW MEDS BY RX/DR IN RCRD: CPT | Mod: CPTII,S$GLB,, | Performed by: OBSTETRICS & GYNECOLOGY

## 2021-10-20 PROCEDURE — 1160F PR REVIEW ALL MEDS BY PRESCRIBER/CLIN PHARMACIST DOCUMENTED: ICD-10-PCS | Mod: CPTII,S$GLB,, | Performed by: OBSTETRICS & GYNECOLOGY

## 2021-10-20 PROCEDURE — 3079F PR MOST RECENT DIASTOLIC BLOOD PRESSURE 80-89 MM HG: ICD-10-PCS | Mod: CPTII,S$GLB,, | Performed by: OBSTETRICS & GYNECOLOGY

## 2021-10-20 PROCEDURE — 99214 OFFICE O/P EST MOD 30 MIN: CPT | Mod: S$GLB,,, | Performed by: OBSTETRICS & GYNECOLOGY

## 2021-10-20 PROCEDURE — 3008F PR BODY MASS INDEX (BMI) DOCUMENTED: ICD-10-PCS | Mod: CPTII,S$GLB,, | Performed by: OBSTETRICS & GYNECOLOGY

## 2021-10-20 PROCEDURE — 3079F DIAST BP 80-89 MM HG: CPT | Mod: CPTII,S$GLB,, | Performed by: OBSTETRICS & GYNECOLOGY

## 2021-10-20 RX ORDER — ESCITALOPRAM OXALATE 20 MG/1
20 TABLET ORAL DAILY
Qty: 30 TABLET | Refills: 11 | Status: SHIPPED | OUTPATIENT
Start: 2021-10-20 | End: 2022-10-04 | Stop reason: SDUPTHER

## 2021-10-21 ENCOUNTER — TELEPHONE (OUTPATIENT)
Dept: FAMILY MEDICINE | Facility: CLINIC | Age: 41
End: 2021-10-21

## 2021-10-21 LAB
ABS NRBC COUNT: 0 X 10 3/UL (ref 0–0.01)
ABSOLUTE BASOPHIL: 0.04 X 10 3/UL (ref 0–0.22)
ABSOLUTE EOSINOPHIL: 0.25 X 10 3/UL (ref 0.04–0.54)
ABSOLUTE IMMATURE GRAN: 0.03 X 10 3/UL (ref 0–0.04)
ABSOLUTE LYMPHOCYTE: 2.44 X 10 3/UL (ref 0.86–4.75)
ABSOLUTE MONOCYTE: 1.26 X 10 3/UL (ref 0.22–1.08)
ALBUMIN SERPL-MCNC: 4.5 G/DL (ref 3.5–5.2)
ALBUMIN/GLOB SERPL ELPH: 2 {RATIO} (ref 1–2.7)
ALP ISOS SERPL LEV INH-CCNC: 35 U/L (ref 40–130)
ALT (SGPT): 18 U/L (ref 0–41)
ANION GAP SERPL CALC-SCNC: 13 MMOL/L (ref 8–17)
AST SERPL-CCNC: 21 U/L (ref 0–40)
BASOPHILS NFR BLD: 0.5 % (ref 0.2–1.2)
BILIRUBIN, TOTAL: 0.22 MG/DL (ref 0–1.2)
BUN/CREAT SERPL: 12.4 (ref 6–20)
CALCIUM SERPL-MCNC: 9.1 MG/DL (ref 8.6–10.2)
CARBON DIOXIDE, CO2: 25 MMOL/L (ref 22–29)
CHLORIDE: 105 MMOL/L (ref 98–107)
CHOLEST SERPL-MSCNC: 165 MG/DL (ref 100–200)
CREAT SERPL-MCNC: 1.01 MG/DL (ref 0.7–1.2)
EOSINOPHIL NFR BLD: 3.2 % (ref 0.7–7)
GFR ESTIMATION: 82.24
GLOBULIN: 2.3 G/DL (ref 1.5–4.5)
GLUCOSE: 88 MG/DL (ref 74–106)
HCT VFR BLD AUTO: 42 % (ref 42–52)
HCV IGG SERPL QL IA: NONREACTIVE
HDLC SERPL-MCNC: 63 MG/DL
HGB BLD-MCNC: 13.5 G/DL (ref 14–18)
HIV 1+2 AB+HIV1 P24 AG SERPL QL IA: NONREACTIVE
IMMATURE GRANULOCYTES: 0.4 % (ref 0–0.5)
LDL/HDL RATIO: 1.1 (ref 1–3)
LDLC SERPL CALC-MCNC: 72.2 MG/DL (ref 0–100)
LYMPHOCYTES NFR BLD: 31 % (ref 19.3–53.1)
MCH RBC QN AUTO: 31.3 PG (ref 27–32)
MCHC RBC AUTO-ENTMCNC: 32.1 G/DL (ref 32–36)
MCV RBC AUTO: 97.2 FL (ref 80–94)
MONOCYTES NFR BLD: 16 % (ref 4.7–12.5)
NEUTROPHILS # BLD AUTO: 3.86 X 10 3/UL (ref 2.15–7.56)
NEUTROPHILS NFR BLD: 48.9 % (ref 34–71.1)
NUCLEATED RED BLOOD CELLS: 0 /100 WBC (ref 0–0.2)
PLATELET # BLD AUTO: 254 X 10 3/UL (ref 135–400)
POTASSIUM: 4 MMOL/L (ref 3.5–5.1)
PROT SNV-MCNC: 6.8 G/DL (ref 6.4–8.3)
RBC # BLD AUTO: 4.32 X 10 6/UL (ref 4.7–6.1)
RDW-SD: 49.6 FL (ref 37–54)
SODIUM: 143 MMOL/L (ref 136–145)
TRIGL SERPL-MCNC: 149 MG/DL (ref 0–150)
TSH SERPL DL<=0.005 MIU/L-ACNC: 1.55 UIU/ML (ref 0.27–4.2)
UREA NITROGEN (BUN): 12.5 MG/DL (ref 6–20)
WBC # BLD: 7.88 X 10 3/UL (ref 4.3–10.8)

## 2021-10-22 ENCOUNTER — OFFICE VISIT (OUTPATIENT)
Dept: ORTHOPEDICS | Facility: CLINIC | Age: 41
End: 2021-10-22
Payer: COMMERCIAL

## 2021-10-22 DIAGNOSIS — M70.22 OLECRANON BURSITIS OF LEFT ELBOW: Primary | ICD-10-CM

## 2021-10-22 PROCEDURE — 1160F RVW MEDS BY RX/DR IN RCRD: CPT | Mod: CPTII,S$GLB,, | Performed by: ORTHOPAEDIC SURGERY

## 2021-10-22 PROCEDURE — 1159F PR MEDICATION LIST DOCUMENTED IN MEDICAL RECORD: ICD-10-PCS | Mod: CPTII,S$GLB,, | Performed by: ORTHOPAEDIC SURGERY

## 2021-10-22 PROCEDURE — 1160F PR REVIEW ALL MEDS BY PRESCRIBER/CLIN PHARMACIST DOCUMENTED: ICD-10-PCS | Mod: CPTII,S$GLB,, | Performed by: ORTHOPAEDIC SURGERY

## 2021-10-22 PROCEDURE — 99212 PR OFFICE/OUTPT VISIT, EST, LEVL II, 10-19 MIN: ICD-10-PCS | Mod: 25,S$GLB,, | Performed by: ORTHOPAEDIC SURGERY

## 2021-10-22 PROCEDURE — 20605 INTERMEDIATE JOINT ASPIRATION/INJECTION: L OLECRANON BURSA: ICD-10-PCS | Mod: LT,S$GLB,, | Performed by: ORTHOPAEDIC SURGERY

## 2021-10-22 PROCEDURE — 1159F MED LIST DOCD IN RCRD: CPT | Mod: CPTII,S$GLB,, | Performed by: ORTHOPAEDIC SURGERY

## 2021-10-22 PROCEDURE — 99212 OFFICE O/P EST SF 10 MIN: CPT | Mod: 25,S$GLB,, | Performed by: ORTHOPAEDIC SURGERY

## 2021-10-22 PROCEDURE — 20605 DRAIN/INJ JOINT/BURSA W/O US: CPT | Mod: LT,S$GLB,, | Performed by: ORTHOPAEDIC SURGERY

## 2021-10-29 ENCOUNTER — TELEPHONE (OUTPATIENT)
Dept: FAMILY MEDICINE | Facility: CLINIC | Age: 41
End: 2021-10-29
Payer: COMMERCIAL

## 2021-11-08 ENCOUNTER — TELEPHONE (OUTPATIENT)
Dept: ORTHOPEDICS | Facility: CLINIC | Age: 41
End: 2021-11-08
Payer: COMMERCIAL

## 2021-11-09 ENCOUNTER — OFFICE VISIT (OUTPATIENT)
Dept: ORTHOPEDICS | Facility: CLINIC | Age: 41
End: 2021-11-09
Payer: COMMERCIAL

## 2021-11-09 VITALS — WEIGHT: 240 LBS | BODY MASS INDEX: 33.6 KG/M2 | HEIGHT: 71 IN

## 2021-11-09 DIAGNOSIS — M70.22 OLECRANON BURSITIS OF LEFT ELBOW: Primary | ICD-10-CM

## 2021-11-09 PROCEDURE — 99499 UNLISTED E&M SERVICE: CPT | Mod: S$GLB,,, | Performed by: ORTHOPAEDIC SURGERY

## 2021-11-09 PROCEDURE — 3008F PR BODY MASS INDEX (BMI) DOCUMENTED: ICD-10-PCS | Mod: CPTII,S$GLB,, | Performed by: ORTHOPAEDIC SURGERY

## 2021-11-09 PROCEDURE — 1159F MED LIST DOCD IN RCRD: CPT | Mod: CPTII,S$GLB,, | Performed by: ORTHOPAEDIC SURGERY

## 2021-11-09 PROCEDURE — 3008F BODY MASS INDEX DOCD: CPT | Mod: CPTII,S$GLB,, | Performed by: ORTHOPAEDIC SURGERY

## 2021-11-09 PROCEDURE — 99499 NO LOS: ICD-10-PCS | Mod: S$GLB,,, | Performed by: ORTHOPAEDIC SURGERY

## 2021-11-09 PROCEDURE — 1159F PR MEDICATION LIST DOCUMENTED IN MEDICAL RECORD: ICD-10-PCS | Mod: CPTII,S$GLB,, | Performed by: ORTHOPAEDIC SURGERY

## 2021-11-09 RX ORDER — PROMETHAZINE HYDROCHLORIDE AND DEXTROMETHORPHAN HYDROBROMIDE 6.25; 15 MG/5ML; MG/5ML
SYRUP ORAL
COMMUNITY
Start: 2021-10-30 | End: 2023-01-09

## 2021-11-09 RX ORDER — DEXTROMETHORPHAN HYDROBROMIDE, GUAIFENESIN, PHENYLEPHRINE HYDROCHLORIDE 17.5; 400; 1 MG/1; MG/1; MG/1
TABLET ORAL
COMMUNITY
Start: 2021-10-30 | End: 2023-01-09

## 2021-11-11 ENCOUNTER — PATIENT MESSAGE (OUTPATIENT)
Dept: ORTHOPEDICS | Facility: CLINIC | Age: 41
End: 2021-11-11
Payer: COMMERCIAL

## 2021-11-29 LAB
ANION GAP SERPL CALC-SCNC: 7 MMOL/L (ref 3–11)
BANDS: 1 % (ref 0–5)
BUN SERPL-MCNC: 7 MG/DL (ref 7–18)
BUN/CREAT SERPL: 6.66 RATIO (ref 7–18)
CALCIUM SERPL-MCNC: 9.2 MG/DL (ref 8.8–10.5)
CELLS COUNTED: 100
CHLORIDE SERPL-SCNC: 103 MMOL/L (ref 100–108)
CO2 SERPL-SCNC: 28 MMOL/L (ref 21–32)
CREAT SERPL-MCNC: 1.05 MG/DL (ref 0.7–1.3)
EOSINOPHIL NFR BLD: 3 % (ref 1–3)
ERYTHROCYTE [DISTWIDTH] IN BLOOD BY AUTOMATED COUNT: 13.7 % (ref 12.5–18)
GFR ESTIMATION: > 60
GLUCOSE SERPL-MCNC: 98 MG/DL (ref 70–110)
HCT VFR BLD AUTO: 46.5 % (ref 42–52)
HGB BLD-MCNC: 15.2 G/DL (ref 14–18)
LYMPHOCYTES NFR BLD: 43 % (ref 25–40)
MCH RBC QN AUTO: 32.6 PG (ref 27–31.2)
MCHC RBC AUTO-ENTMCNC: 32.7 G/DL (ref 31.8–35.4)
MCV RBC AUTO: 99.8 FL (ref 80–97)
MONOCYTES NFR BLD: 12 % (ref 1–15)
NEUTROPHILS # BLD AUTO: 2.9 10*3/UL (ref 1.8–7.7)
NEUTROPHILS NFR BLD: 41 % (ref 37–80)
NUCLEATED RED BLOOD CELLS: 0 %
PLATELETS: 209 10*3/UL (ref 142–424)
POTASSIUM SERPL-SCNC: 4.5 MMOL/L (ref 3.6–5.2)
RBC # BLD AUTO: 4.66 10*6/UL (ref 4.7–6.1)
RBC MORPH BLD: ABNORMAL
SMALL PLATELETS BLD QL SMEAR: ADEQUATE
SODIUM BLD-SCNC: 138 MMOL/L (ref 135–145)
WBC # BLD: 7 10*3/UL (ref 4.6–10.2)

## 2021-12-01 ENCOUNTER — OUTSIDE PLACE OF SERVICE (OUTPATIENT)
Dept: ADMINISTRATIVE | Facility: OTHER | Age: 41
End: 2021-12-01
Payer: COMMERCIAL

## 2021-12-01 PROCEDURE — 24105 EXCISION OLECRANON BURSA: CPT | Mod: LT,,, | Performed by: ORTHOPAEDIC SURGERY

## 2021-12-01 PROCEDURE — 24105 PR REMOVAL OF ELBOW BURSA: ICD-10-PCS | Mod: LT,,, | Performed by: ORTHOPAEDIC SURGERY

## 2021-12-02 ENCOUNTER — TELEPHONE (OUTPATIENT)
Dept: ORTHOPEDICS | Facility: CLINIC | Age: 41
End: 2021-12-02
Payer: COMMERCIAL

## 2021-12-02 LAB — SPECIMEN TO PATHOLOGY: NORMAL

## 2021-12-07 ENCOUNTER — OFFICE VISIT (OUTPATIENT)
Dept: ORTHOPEDICS | Facility: CLINIC | Age: 41
End: 2021-12-07
Payer: COMMERCIAL

## 2021-12-07 DIAGNOSIS — M70.22 OLECRANON BURSITIS OF LEFT ELBOW: Primary | ICD-10-CM

## 2021-12-07 PROCEDURE — 99024 PR POST-OP FOLLOW-UP VISIT: ICD-10-PCS | Mod: S$GLB,,, | Performed by: ORTHOPAEDIC SURGERY

## 2021-12-07 PROCEDURE — 99024 POSTOP FOLLOW-UP VISIT: CPT | Mod: S$GLB,,, | Performed by: ORTHOPAEDIC SURGERY

## 2021-12-07 RX ORDER — HYDROCODONE BITARTRATE AND ACETAMINOPHEN 5; 325 MG/1; MG/1
TABLET ORAL
COMMUNITY
Start: 2021-12-01 | End: 2022-11-07

## 2021-12-09 ENCOUNTER — PATIENT MESSAGE (OUTPATIENT)
Dept: ORTHOPEDICS | Facility: CLINIC | Age: 41
End: 2021-12-09
Payer: COMMERCIAL

## 2021-12-14 ENCOUNTER — OFFICE VISIT (OUTPATIENT)
Dept: ORTHOPEDICS | Facility: CLINIC | Age: 41
End: 2021-12-14
Payer: COMMERCIAL

## 2021-12-14 DIAGNOSIS — M96.840 POSTOPERATIVE HEMATOMA OF MUSCULOSKELETAL STRUCTURE FOLLOWING MUSCULOSKELETAL PROCEDURE: Primary | ICD-10-CM

## 2021-12-14 PROCEDURE — 99024 PR POST-OP FOLLOW-UP VISIT: ICD-10-PCS | Mod: S$GLB,,, | Performed by: ORTHOPAEDIC SURGERY

## 2021-12-14 PROCEDURE — 99024 POSTOP FOLLOW-UP VISIT: CPT | Mod: S$GLB,,, | Performed by: ORTHOPAEDIC SURGERY

## 2021-12-14 PROCEDURE — 20605 DRAIN/INJ JOINT/BURSA W/O US: CPT | Mod: 58,LT,S$GLB, | Performed by: ORTHOPAEDIC SURGERY

## 2021-12-14 PROCEDURE — 20605 INTERMEDIATE JOINT ASPIRATION/INJECTION: ICD-10-PCS | Mod: 58,LT,S$GLB, | Performed by: ORTHOPAEDIC SURGERY

## 2021-12-21 ENCOUNTER — OFFICE VISIT (OUTPATIENT)
Dept: ORTHOPEDICS | Facility: CLINIC | Age: 41
End: 2021-12-21
Payer: COMMERCIAL

## 2021-12-21 DIAGNOSIS — M96.840 POSTOPERATIVE HEMATOMA OF MUSCULOSKELETAL STRUCTURE FOLLOWING MUSCULOSKELETAL PROCEDURE: Primary | ICD-10-CM

## 2021-12-21 PROCEDURE — 99024 POSTOP FOLLOW-UP VISIT: CPT | Mod: S$GLB,,, | Performed by: ORTHOPAEDIC SURGERY

## 2021-12-21 PROCEDURE — 99024 PR POST-OP FOLLOW-UP VISIT: ICD-10-PCS | Mod: S$GLB,,, | Performed by: ORTHOPAEDIC SURGERY

## 2021-12-28 ENCOUNTER — OFFICE VISIT (OUTPATIENT)
Dept: ORTHOPEDICS | Facility: CLINIC | Age: 41
End: 2021-12-28
Payer: COMMERCIAL

## 2021-12-28 DIAGNOSIS — M96.840 POSTOPERATIVE HEMATOMA OF MUSCULOSKELETAL STRUCTURE FOLLOWING MUSCULOSKELETAL PROCEDURE: Primary | ICD-10-CM

## 2021-12-28 PROCEDURE — 1159F PR MEDICATION LIST DOCUMENTED IN MEDICAL RECORD: ICD-10-PCS | Mod: CPTII,S$GLB,, | Performed by: ORTHOPAEDIC SURGERY

## 2021-12-28 PROCEDURE — 1160F RVW MEDS BY RX/DR IN RCRD: CPT | Mod: CPTII,S$GLB,, | Performed by: ORTHOPAEDIC SURGERY

## 2021-12-28 PROCEDURE — 1159F MED LIST DOCD IN RCRD: CPT | Mod: CPTII,S$GLB,, | Performed by: ORTHOPAEDIC SURGERY

## 2021-12-28 PROCEDURE — 99024 POSTOP FOLLOW-UP VISIT: CPT | Mod: S$GLB,,, | Performed by: ORTHOPAEDIC SURGERY

## 2021-12-28 PROCEDURE — 1160F PR REVIEW ALL MEDS BY PRESCRIBER/CLIN PHARMACIST DOCUMENTED: ICD-10-PCS | Mod: CPTII,S$GLB,, | Performed by: ORTHOPAEDIC SURGERY

## 2021-12-28 PROCEDURE — 99024 PR POST-OP FOLLOW-UP VISIT: ICD-10-PCS | Mod: S$GLB,,, | Performed by: ORTHOPAEDIC SURGERY

## 2022-01-31 ENCOUNTER — PATIENT MESSAGE (OUTPATIENT)
Dept: ORTHOPEDICS | Facility: CLINIC | Age: 42
End: 2022-01-31
Payer: COMMERCIAL

## 2022-02-22 ENCOUNTER — PATIENT MESSAGE (OUTPATIENT)
Dept: ORTHOPEDICS | Facility: CLINIC | Age: 42
End: 2022-02-22
Payer: COMMERCIAL

## 2022-03-26 ENCOUNTER — PATIENT MESSAGE (OUTPATIENT)
Dept: FAMILY MEDICINE | Facility: CLINIC | Age: 42
End: 2022-03-26
Payer: COMMERCIAL

## 2022-04-12 ENCOUNTER — OFFICE VISIT (OUTPATIENT)
Dept: FAMILY MEDICINE | Facility: CLINIC | Age: 42
End: 2022-04-12
Payer: COMMERCIAL

## 2022-04-12 VITALS
HEART RATE: 90 BPM | BODY MASS INDEX: 28.98 KG/M2 | WEIGHT: 207 LBS | OXYGEN SATURATION: 99 % | SYSTOLIC BLOOD PRESSURE: 126 MMHG | HEIGHT: 71 IN | RESPIRATION RATE: 16 BRPM | DIASTOLIC BLOOD PRESSURE: 85 MMHG | TEMPERATURE: 98 F

## 2022-04-12 DIAGNOSIS — F41.9 ANXIETY AND DEPRESSION: Primary | ICD-10-CM

## 2022-04-12 DIAGNOSIS — F32.A ANXIETY AND DEPRESSION: Primary | ICD-10-CM

## 2022-04-12 PROCEDURE — 99213 OFFICE O/P EST LOW 20 MIN: CPT | Mod: S$GLB,,, | Performed by: OBSTETRICS & GYNECOLOGY

## 2022-04-12 PROCEDURE — 3079F DIAST BP 80-89 MM HG: CPT | Mod: CPTII,S$GLB,, | Performed by: OBSTETRICS & GYNECOLOGY

## 2022-04-12 PROCEDURE — 1159F MED LIST DOCD IN RCRD: CPT | Mod: CPTII,S$GLB,, | Performed by: OBSTETRICS & GYNECOLOGY

## 2022-04-12 PROCEDURE — 99213 PR OFFICE/OUTPT VISIT, EST, LEVL III, 20-29 MIN: ICD-10-PCS | Mod: S$GLB,,, | Performed by: OBSTETRICS & GYNECOLOGY

## 2022-04-12 PROCEDURE — 3079F PR MOST RECENT DIASTOLIC BLOOD PRESSURE 80-89 MM HG: ICD-10-PCS | Mod: CPTII,S$GLB,, | Performed by: OBSTETRICS & GYNECOLOGY

## 2022-04-12 PROCEDURE — 3008F BODY MASS INDEX DOCD: CPT | Mod: CPTII,S$GLB,, | Performed by: OBSTETRICS & GYNECOLOGY

## 2022-04-12 PROCEDURE — 1159F PR MEDICATION LIST DOCUMENTED IN MEDICAL RECORD: ICD-10-PCS | Mod: CPTII,S$GLB,, | Performed by: OBSTETRICS & GYNECOLOGY

## 2022-04-12 PROCEDURE — 3074F PR MOST RECENT SYSTOLIC BLOOD PRESSURE < 130 MM HG: ICD-10-PCS | Mod: CPTII,S$GLB,, | Performed by: OBSTETRICS & GYNECOLOGY

## 2022-04-12 PROCEDURE — 3074F SYST BP LT 130 MM HG: CPT | Mod: CPTII,S$GLB,, | Performed by: OBSTETRICS & GYNECOLOGY

## 2022-04-12 PROCEDURE — 3008F PR BODY MASS INDEX (BMI) DOCUMENTED: ICD-10-PCS | Mod: CPTII,S$GLB,, | Performed by: OBSTETRICS & GYNECOLOGY

## 2022-04-12 RX ORDER — ARIPIPRAZOLE 2 MG/1
2 TABLET ORAL DAILY
Qty: 30 TABLET | Refills: 6 | Status: SHIPPED | OUTPATIENT
Start: 2022-04-12 | End: 2022-10-04 | Stop reason: SDUPTHER

## 2022-04-12 NOTE — PROGRESS NOTES
Subjective:   Patient ID: Luis Liu is a 41 y.o. male who presents for evaluation today.    Chief Complaint:  Medication Problem (Patient states that he needs some mediation to go with his Lexapro )      History of Present Illness  HPI   Anxiety follow up - Patient presents today for follow up of anxiety and depression. Current symptoms: anxiety attacks, dysphoric mood. He denies current suicidal and homicidal ideation. He complains of the following side effects from the treatment: none. He states since beginning treatment He feels the symptoms are Minimally improved. He was previously on aripiprazole 2 mg and would like to resume treatment. He states he feels it helped a lot. He also was previously in therapy and would like to resume therapy if possible.    FAMILY HISTORY  Family History   Problem Relation Age of Onset    Hypertension Mother     Hypertension Father     Colon cancer Maternal Grandmother      SOCIAL HISTORY  Social History     Tobacco Use   Smoking Status Current Some Day Smoker   Smokeless Tobacco Never Used   ,   Social History     Substance and Sexual Activity   Alcohol Use Yes     MEDICATIONS  Outpatient Medications Marked as Taking for the 4/12/22 encounter (Office Visit) with Charlotte French NP   Medication Sig Dispense Refill    EScitalopram oxalate (LEXAPRO) 20 MG tablet Take 1 tablet (20 mg total) by mouth once daily. 30 tablet 11    pantoprazole (PROTONIX) 20 MG tablet TAKE 1 TABLET(20 MG) BY MOUTH EVERY DAY 30 tablet 5    triamcinolone acetonide 0.1% (KENALOG) 0.1 % cream APPLY TOPICALLY TO TRUNK AND EXTREMITIES TWICE DAILY AS NEEDED       Review of Systems   Review of Systems   Constitutional: Negative for activity change, appetite change, fever and unexpected weight change.   HENT: Negative for dental problem, hearing loss, sneezing, sore throat, trouble swallowing and voice change.    Eyes: Negative for visual disturbance.   Respiratory: Negative for cough, choking, chest  "tightness and shortness of breath.    Cardiovascular: Negative for chest pain, palpitations, leg swelling and claudication.   Gastrointestinal: Negative for abdominal pain, blood in stool, change in bowel habit, nausea, vomiting, fecal incontinence and change in bowel habit.   Endocrine: Negative for polydipsia, polyphagia and polyuria.   Genitourinary: Negative for decreased urine volume, dysuria and hematuria.   Musculoskeletal: Negative for gait problem, neck pain and neck stiffness.   Integumentary:  Negative for color change, pallor, rash, wound and mole/lesion.   Allergic/Immunologic: Negative for frequent infections.   Neurological: Negative for dizziness, vertigo, seizures, syncope, speech difficulty, disturbances in coordination and coordination difficulties.   Hematological: Negative for adenopathy. Does not bruise/bleed easily.   Psychiatric/Behavioral: Positive for dysphoric mood. Negative for behavioral problems, confusion, hallucinations, self-injury, sleep disturbance and suicidal ideas. The patient is nervous/anxious.          Objective:    VITALS  BP Readings from Last 1 Encounters:   04/12/22 126/85   Weight: 93.9 kg (207 lb)   Height: 5' 11" (180.3 cm)   BMI Readings from Last 1 Encounters:   04/12/22 28.87 kg/m²       Physical Exam  Vitals and nursing note reviewed.   Constitutional:       General: He is not in acute distress.     Appearance: Normal appearance. He is not ill-appearing, toxic-appearing or diaphoretic.   HENT:      Head: Normocephalic and atraumatic.      Right Ear: External ear normal.      Left Ear: External ear normal.      Nose: Nose normal.      Mouth/Throat:      Mouth: Mucous membranes are moist.   Eyes:      General:         Right eye: No discharge.         Left eye: No discharge.   Cardiovascular:      Rate and Rhythm: Normal rate.   Pulmonary:      Effort: Pulmonary effort is normal. No respiratory distress.   Musculoskeletal:         General: Normal range of motion.      " Cervical back: Normal range of motion and neck supple.      Right lower leg: No edema.      Left lower leg: No edema.   Skin:     General: Skin is warm and dry.      Coloration: Skin is not jaundiced or pale.   Neurological:      General: No focal deficit present.      Mental Status: He is alert and oriented to person, place, and time.      Gait: Gait normal.   Psychiatric:         Mood and Affect: Mood normal.         Behavior: Behavior normal.         Thought Content: Thought content normal.         Judgment: Judgment normal.         Assessment:      1. Anxiety and depression       Plan:   Anxiety and depression  -     ARIPiprazole (ABILIFY) 2 MG Tab; Take 1 tablet (2 mg total) by mouth once daily.  Dispense: 30 tablet; Refill: 6  -     Ambulatory referral/consult to Psychiatry; Future; Expected date: 04/19/2022    Will plan for referral to psych for further medication management & to assist in setting up therapy. Referral faxed today. Patient instructed to contact our office if a call has not received from the facility to schedule an appointment within the next 2 weeks.     Patient instructed to contact the clinic should any questions or concerns arise prior to next office visit. Risks, benefits, and alternatives discussed with patient. Patient verbalized understanding of discussed plan of care. Asked patient if any further questions, answered no. Follow up as discussed or sooner PRN.

## 2022-04-14 DIAGNOSIS — F32.A ANXIETY AND DEPRESSION: Primary | ICD-10-CM

## 2022-04-14 DIAGNOSIS — F41.9 ANXIETY AND DEPRESSION: Primary | ICD-10-CM

## 2022-05-11 ENCOUNTER — PATIENT MESSAGE (OUTPATIENT)
Dept: ORTHOPEDICS | Facility: CLINIC | Age: 42
End: 2022-05-11
Payer: COMMERCIAL

## 2022-06-01 ENCOUNTER — PATIENT MESSAGE (OUTPATIENT)
Dept: ORTHOPEDICS | Facility: CLINIC | Age: 42
End: 2022-06-01
Payer: COMMERCIAL
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
## 2022-10-04 ENCOUNTER — OFFICE VISIT (OUTPATIENT)
Dept: FAMILY MEDICINE | Facility: CLINIC | Age: 42
End: 2022-10-04
Payer: COMMERCIAL

## 2022-10-04 VITALS
HEIGHT: 71 IN | DIASTOLIC BLOOD PRESSURE: 85 MMHG | BODY MASS INDEX: 30.8 KG/M2 | TEMPERATURE: 98 F | WEIGHT: 220 LBS | OXYGEN SATURATION: 97 % | HEART RATE: 93 BPM | RESPIRATION RATE: 14 BRPM | SYSTOLIC BLOOD PRESSURE: 124 MMHG

## 2022-10-04 DIAGNOSIS — F41.9 ANXIETY AND DEPRESSION: ICD-10-CM

## 2022-10-04 DIAGNOSIS — F32.A ANXIETY AND DEPRESSION: ICD-10-CM

## 2022-10-04 DIAGNOSIS — L30.9 ECZEMA, UNSPECIFIED TYPE: ICD-10-CM

## 2022-10-04 DIAGNOSIS — K21.9 GASTROESOPHAGEAL REFLUX DISEASE WITHOUT ESOPHAGITIS: ICD-10-CM

## 2022-10-04 DIAGNOSIS — Z00.00 ROUTINE ADULT HEALTH MAINTENANCE: Primary | ICD-10-CM

## 2022-10-04 LAB
ABS NRBC COUNT: 0 X 10 3/UL (ref 0–0.01)
ABSOLUTE BASOPHIL: 0.03 X 10 3/UL (ref 0–0.22)
ABSOLUTE EOSINOPHIL: 0.24 X 10 3/UL (ref 0.04–0.54)
ABSOLUTE IMMATURE GRAN: 0.03 X 10 3/UL (ref 0–0.04)
ABSOLUTE LYMPHOCYTE: 2.38 X 10 3/UL (ref 0.86–4.75)
ABSOLUTE MONOCYTE: 1 X 10 3/UL (ref 0.22–1.08)
ALBUMIN SERPL-MCNC: 4.7 G/DL (ref 3.5–5.2)
ALBUMIN/GLOB SERPL ELPH: 2 {RATIO} (ref 1–2.7)
ALP ISOS SERPL LEV INH-CCNC: 28 U/L (ref 40–130)
ALT (SGPT): 17 U/L (ref 0–41)
ANION GAP SERPL CALC-SCNC: 14 MMOL/L (ref 8–17)
AST SERPL-CCNC: 19 U/L (ref 0–40)
BASOPHILS NFR BLD: 0.4 % (ref 0.2–1.2)
BILIRUBIN, TOTAL: 0.24 MG/DL (ref 0–1.2)
BUN/CREAT SERPL: 11.1 (ref 6–20)
CALCIUM SERPL-MCNC: 9.2 MG/DL (ref 8.6–10.2)
CARBON DIOXIDE, CO2: 24 MMOL/L (ref 22–29)
CHLORIDE: 105 MMOL/L (ref 98–107)
CHOLEST SERPL-MSCNC: 200 MG/DL (ref 100–200)
CREAT SERPL-MCNC: 0.99 MG/DL (ref 0.7–1.2)
EOSINOPHIL NFR BLD: 3.3 % (ref 0.7–7)
GFR ESTIMATION: 98.76
GLOBULIN: 2.3 G/DL (ref 1.5–4.5)
GLUCOSE: 102 MG/DL (ref 74–106)
HCT VFR BLD AUTO: 42.8 % (ref 42–52)
HDLC SERPL-MCNC: 67 MG/DL
HGB BLD-MCNC: 14.5 G/DL (ref 14–18)
IMMATURE GRANULOCYTES: 0.4 % (ref 0–0.5)
LDL/HDL RATIO: 1.4 (ref 1–3)
LDLC SERPL CALC-MCNC: 95.2 MG/DL (ref 0–100)
LYMPHOCYTES NFR BLD: 32.7 % (ref 19.3–53.1)
MCH RBC QN AUTO: 33.6 PG (ref 27–32)
MCHC RBC AUTO-ENTMCNC: 33.9 G/DL (ref 32–36)
MCV RBC AUTO: 99.3 FL (ref 80–94)
MONOCYTES NFR BLD: 13.8 % (ref 4.7–12.5)
NEUTROPHILS # BLD AUTO: 3.59 X 10 3/UL (ref 2.15–7.56)
NEUTROPHILS NFR BLD: 49.4 % (ref 34–71.1)
NUCLEATED RED BLOOD CELLS: 0 /100 WBC (ref 0–0.2)
PLATELET # BLD AUTO: 200 X 10 3/UL (ref 135–400)
POTASSIUM: 4 MMOL/L (ref 3.5–5.1)
PROT SNV-MCNC: 7 G/DL (ref 6.4–8.3)
RBC # BLD AUTO: 4.31 X 10 6/UL (ref 4.7–6.1)
RDW-SD: 48.2 FL (ref 37–54)
SODIUM: 143 MMOL/L (ref 136–145)
TRIGL SERPL-MCNC: 189 MG/DL (ref 0–150)
TSH SERPL DL<=0.005 MIU/L-ACNC: 2.41 UIU/ML (ref 0.27–4.2)
UREA NITROGEN (BUN): 11 MG/DL (ref 6–20)
WBC # BLD: 7.27 X 10 3/UL (ref 4.3–10.8)

## 2022-10-04 PROCEDURE — 3074F PR MOST RECENT SYSTOLIC BLOOD PRESSURE < 130 MM HG: ICD-10-PCS | Mod: CPTII,S$GLB,, | Performed by: OBSTETRICS & GYNECOLOGY

## 2022-10-04 PROCEDURE — 99214 PR OFFICE/OUTPT VISIT, EST, LEVL IV, 30-39 MIN: ICD-10-PCS | Mod: S$GLB,,, | Performed by: OBSTETRICS & GYNECOLOGY

## 2022-10-04 PROCEDURE — 1159F PR MEDICATION LIST DOCUMENTED IN MEDICAL RECORD: ICD-10-PCS | Mod: CPTII,S$GLB,, | Performed by: OBSTETRICS & GYNECOLOGY

## 2022-10-04 PROCEDURE — 3008F PR BODY MASS INDEX (BMI) DOCUMENTED: ICD-10-PCS | Mod: CPTII,S$GLB,, | Performed by: OBSTETRICS & GYNECOLOGY

## 2022-10-04 PROCEDURE — 3079F PR MOST RECENT DIASTOLIC BLOOD PRESSURE 80-89 MM HG: ICD-10-PCS | Mod: CPTII,S$GLB,, | Performed by: OBSTETRICS & GYNECOLOGY

## 2022-10-04 PROCEDURE — 3008F BODY MASS INDEX DOCD: CPT | Mod: CPTII,S$GLB,, | Performed by: OBSTETRICS & GYNECOLOGY

## 2022-10-04 PROCEDURE — 1159F MED LIST DOCD IN RCRD: CPT | Mod: CPTII,S$GLB,, | Performed by: OBSTETRICS & GYNECOLOGY

## 2022-10-04 PROCEDURE — 3079F DIAST BP 80-89 MM HG: CPT | Mod: CPTII,S$GLB,, | Performed by: OBSTETRICS & GYNECOLOGY

## 2022-10-04 PROCEDURE — 99214 OFFICE O/P EST MOD 30 MIN: CPT | Mod: S$GLB,,, | Performed by: OBSTETRICS & GYNECOLOGY

## 2022-10-04 PROCEDURE — 3074F SYST BP LT 130 MM HG: CPT | Mod: CPTII,S$GLB,, | Performed by: OBSTETRICS & GYNECOLOGY

## 2022-10-04 RX ORDER — ESCITALOPRAM OXALATE 20 MG/1
20 TABLET ORAL DAILY
Qty: 90 TABLET | Refills: 1 | Status: SHIPPED | OUTPATIENT
Start: 2022-10-04 | End: 2023-10-04

## 2022-10-04 RX ORDER — PANTOPRAZOLE SODIUM 20 MG/1
20 TABLET, DELAYED RELEASE ORAL DAILY
Qty: 90 TABLET | Refills: 1 | Status: SHIPPED | OUTPATIENT
Start: 2022-10-04

## 2022-10-04 RX ORDER — ARIPIPRAZOLE 2 MG/1
2 TABLET ORAL DAILY
Qty: 90 TABLET | Refills: 1 | Status: SHIPPED | OUTPATIENT
Start: 2022-10-04

## 2022-10-04 RX ORDER — TRIAMCINOLONE ACETONIDE 1 MG/G
CREAM TOPICAL
Qty: 80 G | Refills: 1 | Status: SHIPPED | OUTPATIENT
Start: 2022-10-04

## 2022-10-04 NOTE — PROGRESS NOTES
Subjective:   Patient ID: Luis Liu is a 41 y.o. male who presents for evaluation today.    Chief Complaint:  Follow-up      History of Present Illness  HPI  Anxiety/Depression  Patient presents today for follow up of anxiety and depression. Current symptoms: none. He denies current suicidal and homicidal ideation. He complains of the following side effects from the treatment: none. He states since beginning treatment He feels the symptoms are Greatly improved.     Acid reflux  Takes pantoprazole for symptoms  States symptoms are well controlled     FAMILY HISTORY  Family History   Problem Relation Age of Onset    Hypertension Mother     Hypertension Father     Colon cancer Maternal Grandmother      SOCIAL HISTORY  Social History     Tobacco Use   Smoking Status Some Days    Types: Cigars   Smokeless Tobacco Never   ,   Social History     Substance and Sexual Activity   Alcohol Use Yes     MEDICATIONS  Outpatient Medications Marked as Taking for the 10/4/22 encounter (Office Visit) with Charlotte French NP   Medication Sig Dispense Refill    [DISCONTINUED] ARIPiprazole (ABILIFY) 2 MG Tab Take 1 tablet (2 mg total) by mouth once daily. 30 tablet 6    [DISCONTINUED] EScitalopram oxalate (LEXAPRO) 20 MG tablet Take 1 tablet (20 mg total) by mouth once daily. 30 tablet 11    [DISCONTINUED] pantoprazole (PROTONIX) 20 MG tablet TAKE 1 TABLET(20 MG) BY MOUTH EVERY DAY 30 tablet 5    [DISCONTINUED] triamcinolone acetonide 0.1% (KENALOG) 0.1 % cream APPLY TOPICALLY TO TRUNK AND EXTREMITIES TWICE DAILY AS NEEDED       Review of Systems   Review of Systems   Constitutional:  Negative for activity change, appetite change, fever and unexpected weight change.   HENT:  Negative for dental problem, hearing loss, sneezing, sore throat, trouble swallowing and voice change.    Eyes:  Negative for visual disturbance.   Respiratory:  Negative for cough, choking, chest tightness and shortness of breath.    Cardiovascular:   "Negative for chest pain, palpitations, leg swelling and claudication.   Gastrointestinal:  Negative for abdominal pain, blood in stool, change in bowel habit, nausea, vomiting, fecal incontinence and change in bowel habit.   Endocrine: Negative for polydipsia, polyphagia and polyuria.   Genitourinary:  Negative for decreased urine volume, dysuria and hematuria.   Musculoskeletal:  Negative for gait problem, neck pain and neck stiffness.   Integumentary:  Negative for color change, pallor, rash, wound and mole/lesion.   Allergic/Immunologic: Negative for frequent infections.   Neurological:  Negative for dizziness, vertigo, seizures, syncope, speech difficulty, coordination difficulties and coordination difficulties.   Hematological:  Negative for adenopathy. Does not bruise/bleed easily.   Psychiatric/Behavioral:  Negative for behavioral problems, confusion, hallucinations, self-injury, sleep disturbance and suicidal ideas.        Objective:    VITALS  BP Readings from Last 1 Encounters:   10/04/22 124/85   Weight: 99.8 kg (220 lb)   Height: 5' 11" (180.3 cm)   BMI Readings from Last 1 Encounters:   10/04/22 30.68 kg/m²       Physical Exam  Vitals and nursing note reviewed.   Constitutional:       General: He is not in acute distress.     Appearance: Normal appearance. He is not ill-appearing, toxic-appearing or diaphoretic.   HENT:      Head: Normocephalic and atraumatic.      Right Ear: External ear normal.      Left Ear: External ear normal.      Nose: Nose normal.   Eyes:      General:         Right eye: No discharge.         Left eye: No discharge.   Neck:      Thyroid: No thyroid mass, thyromegaly or thyroid tenderness.      Vascular: No carotid bruit.   Cardiovascular:      Rate and Rhythm: Normal rate and regular rhythm.      Heart sounds: Normal heart sounds. No murmur heard.    No friction rub. No gallop.   Pulmonary:      Effort: Pulmonary effort is normal. No respiratory distress.      Breath sounds: " Normal breath sounds. No stridor. No wheezing, rhonchi or rales.   Chest:      Chest wall: No tenderness.   Musculoskeletal:         General: Normal range of motion.      Cervical back: Normal range of motion and neck supple.      Right lower leg: No edema.      Left lower leg: No edema.   Skin:     General: Skin is warm and dry.      Coloration: Skin is not jaundiced or pale.   Neurological:      General: No focal deficit present.      Mental Status: He is alert and oriented to person, place, and time.      Gait: Gait normal.   Psychiatric:         Mood and Affect: Mood normal.         Behavior: Behavior normal.         Thought Content: Thought content normal.         Judgment: Judgment normal.       Assessment:      1. Routine adult health maintenance    2. Gastroesophageal reflux disease without esophagitis    3. Anxiety and depression       Plan:   Routine adult health maintenance  -     CBC Auto Differential  -     Comprehensive Metabolic Panel  -     Lipid Panel  -     TSH  Counseled on age appropriate medical preventative services, including age appropriate cancer and preventive health screenings, over all nutritional health, need for a consistent exercise regimen and an over all push towards maintaining a vigorous and active lifestyle.  Counseled on age appropriate vaccines and discussed upcoming health care needs based on age/gender. Discussed use of common OTC medications and supplements.  Discussed the need for good sleep hygiene and stress management.     Gastroesophageal reflux disease without esophagitis  -     pantoprazole (PROTONIX) 20 MG tablet; Take 1 tablet (20 mg total) by mouth once daily.  Dispense: 90 tablet; Refill: 1  Nonpharmacologic treatments were discussed including: eating smaller meals, avoid eating late at night, staying upright for 1-2 hours after meals, weight loss if indicated, elevation of the head of bed at night, and avoiding tight fitting clothing. Reviewed dietary tips to aid  in decreasing flares - avoiding highly seasoned, fatty, and dairy foods, avoidance of caffeine, chocolate, nicotine and peppermint. Educated to report to ER with fever, worsening n/v, blood/mucus in stool, worsening abdominal pain.       Anxiety and depression  -     ARIPiprazole (ABILIFY) 2 MG Tab; Take 1 tablet (2 mg total) by mouth once daily.  Dispense: 90 tablet; Refill: 1  -     EScitalopram oxalate (LEXAPRO) 20 MG tablet; Take 1 tablet (20 mg total) by mouth once daily.  Dispense: 90 tablet; Refill: 1  Educated regarding non-pharmacological methods for dealing with symptoms of anxiety such as, relaxation techniques, deep breathing exercises, exercise, etc. Discussion of pharmacological management was carried out at length. Medication regimen, side effects, mechanism of action, etc. discussed.  Please note the following aspects associated with the treatment regimen:  When first starting medication or making dose adjustments, it may take 2-4 weeks to begin feeling the effects of medication  Avoid abrupt withdrawal from treatment. If you wish to discontinue medication therapy, please contact the office before doing so to discuss how to safely get off the medication.  Instructed to report immediately to the nearest ER with any thoughts of suicidal or homicidal ideation. Please notify provider immediately.  Resources are available for dealing with suicidal thoughts: [phone] ([phone]) or [email or web address]   Contact provider immediately if symptoms worsen or new symptoms appear.  Report to the nearest emergency room if symptoms of serotonin syndrome are experienced such as sudden onset of high fever, muscular rigidity, mental status changes, hyperreflexia/clonus, and uncontrolled shivering     Other orders  -     triamcinolone acetonide 0.1% (KENALOG) 0.1 % cream; APPLY TOPICALLY TO TRUNK AND EXTREMITIES TWICE DAILY AS NEEDED Strength: 0.1 %  Dispense: 80 g; Refill: 1    Patient instructed to  contact the clinic should any questions or concerns arise prior to next office visit. Risks, benefits, and alternatives discussed with patient. Patient verbalized understanding of discussed plan of care. Asked patient if any further questions, answered no. Follow up as discussed or sooner PRN.

## 2022-10-06 ENCOUNTER — OFFICE VISIT (OUTPATIENT)
Dept: ORTHOPEDICS | Facility: CLINIC | Age: 42
End: 2022-10-06
Payer: COMMERCIAL

## 2022-10-06 DIAGNOSIS — M77.12 LATERAL EPICONDYLITIS, LEFT ELBOW: Primary | ICD-10-CM

## 2022-10-06 PROCEDURE — 1159F MED LIST DOCD IN RCRD: CPT | Mod: CPTII,S$GLB,, | Performed by: ORTHOPAEDIC SURGERY

## 2022-10-06 PROCEDURE — 1159F PR MEDICATION LIST DOCUMENTED IN MEDICAL RECORD: ICD-10-PCS | Mod: CPTII,S$GLB,, | Performed by: ORTHOPAEDIC SURGERY

## 2022-10-06 PROCEDURE — 1160F PR REVIEW ALL MEDS BY PRESCRIBER/CLIN PHARMACIST DOCUMENTED: ICD-10-PCS | Mod: CPTII,S$GLB,, | Performed by: ORTHOPAEDIC SURGERY

## 2022-10-06 PROCEDURE — 99499 NO LOS: ICD-10-PCS | Mod: S$GLB,,, | Performed by: ORTHOPAEDIC SURGERY

## 2022-10-06 PROCEDURE — 99499 UNLISTED E&M SERVICE: CPT | Mod: S$GLB,,, | Performed by: ORTHOPAEDIC SURGERY

## 2022-10-06 PROCEDURE — 1160F RVW MEDS BY RX/DR IN RCRD: CPT | Mod: CPTII,S$GLB,, | Performed by: ORTHOPAEDIC SURGERY

## 2022-10-06 NOTE — PROGRESS NOTES
Subjective:      Patient ID: Luis Liu is a 41 y.o. male.    Chief Complaint: Pain of the Left Elbow    HPI patient comes in with left elbow pain.  He states his elbow locks up.  He is unable to do his job at work which requires lifting up to 50 lb.    ROS unchanged from prior visit      Objective:      Active and passive range of motion of the left elbow is normal.  There is no swelling.  He has tenderness over the lateral elbow compatible with lateral epicondylitis.      Ortho/SPM Exam            Assessment:       Encounter Diagnosis   Name Primary?    Lateral epicondylitis, left elbow Yes          Plan:       Luis was seen today for pain.    Diagnoses and all orders for this visit:    Lateral epicondylitis, left elbow    He is placed on restricted duty no lifting more than 30 lb.  He will contact us if he fails to improve

## 2022-10-07 ENCOUNTER — PATIENT MESSAGE (OUTPATIENT)
Dept: FAMILY MEDICINE | Facility: CLINIC | Age: 42
End: 2022-10-07
Payer: MEDICAID

## 2022-10-07 ENCOUNTER — HOSPITAL ENCOUNTER (OUTPATIENT)
Dept: CT IMAGING | Age: 42
Discharge: HOME | End: 2022-10-10
Payer: COMMERCIAL

## 2022-10-07 VITALS — BODY MASS INDEX: 25.03 KG/M2 | HEIGHT: 74 IN | WEIGHT: 195 LBS

## 2022-10-07 DIAGNOSIS — F17.200 SMOKING: ICD-10-CM

## 2022-10-07 PROCEDURE — 71271 CT THORAX LUNG CANCER SCR C-: CPT

## 2022-10-27 ENCOUNTER — PATIENT MESSAGE (OUTPATIENT)
Dept: ORTHOPEDICS | Facility: CLINIC | Age: 42
End: 2022-10-27
Payer: MEDICAID

## 2022-11-07 ENCOUNTER — OFFICE VISIT (OUTPATIENT)
Dept: ORTHOPEDICS | Facility: CLINIC | Age: 42
End: 2022-11-07
Payer: COMMERCIAL

## 2022-11-07 DIAGNOSIS — M77.12 LATERAL EPICONDYLITIS, LEFT ELBOW: Primary | ICD-10-CM

## 2022-11-07 PROCEDURE — 1159F MED LIST DOCD IN RCRD: CPT | Mod: CPTII,S$GLB,, | Performed by: ORTHOPAEDIC SURGERY

## 2022-11-07 PROCEDURE — 1160F PR REVIEW ALL MEDS BY PRESCRIBER/CLIN PHARMACIST DOCUMENTED: ICD-10-PCS | Mod: CPTII,S$GLB,, | Performed by: ORTHOPAEDIC SURGERY

## 2022-11-07 PROCEDURE — 99212 PR OFFICE/OUTPT VISIT, EST, LEVL II, 10-19 MIN: ICD-10-PCS | Mod: S$GLB,,, | Performed by: ORTHOPAEDIC SURGERY

## 2022-11-07 PROCEDURE — 1159F PR MEDICATION LIST DOCUMENTED IN MEDICAL RECORD: ICD-10-PCS | Mod: CPTII,S$GLB,, | Performed by: ORTHOPAEDIC SURGERY

## 2022-11-07 PROCEDURE — 99212 OFFICE O/P EST SF 10 MIN: CPT | Mod: S$GLB,,, | Performed by: ORTHOPAEDIC SURGERY

## 2022-11-07 PROCEDURE — 1160F RVW MEDS BY RX/DR IN RCRD: CPT | Mod: CPTII,S$GLB,, | Performed by: ORTHOPAEDIC SURGERY

## 2022-11-07 RX ORDER — NAPROXEN 500 MG/1
TABLET ORAL
COMMUNITY
Start: 2022-11-02

## 2022-11-07 RX ORDER — CYCLOBENZAPRINE HCL 10 MG
TABLET ORAL
COMMUNITY
Start: 2022-11-02 | End: 2023-01-09

## 2022-11-07 NOTE — PROGRESS NOTES
Subjective:      Patient ID: Luis Liu is a 41 y.o. male.    Chief Complaint: Pain of the Left Elbow    HPI patient comes in today for a work release for his left elbow.    ROS unchanged from prior visit      Objective:      Active and passive range of motion of the elbow is normal.  He has no tenderness.  He has normal sensation and pulses.      Ortho/SPM Exam            Assessment:       Encounter Diagnosis   Name Primary?    Lateral epicondylitis, left elbow Yes          Plan:       Luis was seen today for pain.    Diagnoses and all orders for this visit:    Lateral epicondylitis, left elbow    Return p.r.n.

## 2022-11-08 ENCOUNTER — TELEPHONE (OUTPATIENT)
Dept: ORTHOPEDICS | Facility: CLINIC | Age: 42
End: 2022-11-08
Payer: MEDICAID

## 2022-11-08 NOTE — TELEPHONE ENCOUNTER
----- Message from Crissy Hart sent at 11/8/2022 11:01 AM CST -----  Contact: Rossy([facility])  Rossy called to consult with nurse or staff regarding the patients recent visit. She would like to know if the patients work restrictions have been extended and would like a call back. Rossy can be reached at [phone] and left a fax number of [phone]. Thanks/Mr

## 2022-11-08 NOTE — TELEPHONE ENCOUNTER
11/8/2022  11:25 am    Spoke kristen Blair    Informed her that Mr. Liu was released to full duty on 11/8/2022

## 2023-01-03 ENCOUNTER — PATIENT MESSAGE (OUTPATIENT)
Dept: FAMILY MEDICINE | Facility: CLINIC | Age: 43
End: 2023-01-03
Payer: MEDICAID

## 2023-01-09 ENCOUNTER — OFFICE VISIT (OUTPATIENT)
Dept: FAMILY MEDICINE | Facility: CLINIC | Age: 43
End: 2023-01-09
Payer: COMMERCIAL

## 2023-01-09 VITALS
OXYGEN SATURATION: 98 % | BODY MASS INDEX: 29.26 KG/M2 | DIASTOLIC BLOOD PRESSURE: 82 MMHG | WEIGHT: 209 LBS | HEIGHT: 71 IN | RESPIRATION RATE: 14 BRPM | TEMPERATURE: 97 F | HEART RATE: 60 BPM | SYSTOLIC BLOOD PRESSURE: 117 MMHG

## 2023-01-09 DIAGNOSIS — Z01.818 PREOPERATIVE EXAMINATION: Primary | ICD-10-CM

## 2023-01-09 PROCEDURE — 1159F MED LIST DOCD IN RCRD: CPT | Mod: CPTII,S$GLB,, | Performed by: OBSTETRICS & GYNECOLOGY

## 2023-01-09 PROCEDURE — 3074F PR MOST RECENT SYSTOLIC BLOOD PRESSURE < 130 MM HG: ICD-10-PCS | Mod: CPTII,S$GLB,, | Performed by: OBSTETRICS & GYNECOLOGY

## 2023-01-09 PROCEDURE — 1159F PR MEDICATION LIST DOCUMENTED IN MEDICAL RECORD: ICD-10-PCS | Mod: CPTII,S$GLB,, | Performed by: OBSTETRICS & GYNECOLOGY

## 2023-01-09 PROCEDURE — 3074F SYST BP LT 130 MM HG: CPT | Mod: CPTII,S$GLB,, | Performed by: OBSTETRICS & GYNECOLOGY

## 2023-01-09 PROCEDURE — 99214 PR OFFICE/OUTPT VISIT, EST, LEVL IV, 30-39 MIN: ICD-10-PCS | Mod: S$GLB,,, | Performed by: OBSTETRICS & GYNECOLOGY

## 2023-01-09 PROCEDURE — 3079F PR MOST RECENT DIASTOLIC BLOOD PRESSURE 80-89 MM HG: ICD-10-PCS | Mod: CPTII,S$GLB,, | Performed by: OBSTETRICS & GYNECOLOGY

## 2023-01-09 PROCEDURE — 3008F PR BODY MASS INDEX (BMI) DOCUMENTED: ICD-10-PCS | Mod: CPTII,S$GLB,, | Performed by: OBSTETRICS & GYNECOLOGY

## 2023-01-09 PROCEDURE — 3008F BODY MASS INDEX DOCD: CPT | Mod: CPTII,S$GLB,, | Performed by: OBSTETRICS & GYNECOLOGY

## 2023-01-09 PROCEDURE — 3079F DIAST BP 80-89 MM HG: CPT | Mod: CPTII,S$GLB,, | Performed by: OBSTETRICS & GYNECOLOGY

## 2023-01-09 PROCEDURE — 99214 OFFICE O/P EST MOD 30 MIN: CPT | Mod: S$GLB,,, | Performed by: OBSTETRICS & GYNECOLOGY

## 2023-01-09 NOTE — PROGRESS NOTES
Subjective:      Luis Liu is a 42 y.o. male who presents to the office today for a preoperative consultation at the request of surgeon Dr. Casas who plans on performing partial hip replacement on January 10.   Planned anesthesia: general.   The patient has the following known anesthesia issues:  none . Patients bleeding risk: no recent abnormal bleeding, no remote history of abnormal bleeding, and no use of Ca-channel blockers.   Patient does not have objections to receiving blood products if needed.    The following portions of the patient's history were reviewed and updated as appropriate: allergies, current medications, past family history, past medical history, past social history, past surgical history, and problem list.    Review of Systems   Review of Systems   Constitutional:  Negative for activity change, appetite change, fever and unexpected weight change.   HENT:  Negative for dental problem, hearing loss, sneezing, sore throat, trouble swallowing and voice change.    Eyes:  Negative for visual disturbance.   Respiratory:  Negative for cough, choking, chest tightness and shortness of breath.    Cardiovascular:  Negative for chest pain, palpitations, leg swelling and claudication.   Gastrointestinal:  Negative for abdominal pain, blood in stool, change in bowel habit, nausea, vomiting, fecal incontinence and change in bowel habit.   Endocrine: Negative for polydipsia, polyphagia and polyuria.   Genitourinary:  Negative for decreased urine volume, dysuria and hematuria.   Musculoskeletal:  Negative for gait problem, neck pain and neck stiffness.   Integumentary:  Negative for color change, pallor, rash, wound and mole/lesion.   Allergic/Immunologic: Negative for frequent infections.   Neurological:  Negative for dizziness, vertigo, seizures, syncope, speech difficulty, coordination difficulties and coordination difficulties.   Hematological:  Negative for adenopathy. Does not bruise/bleed easily.  "  Psychiatric/Behavioral:  Negative for behavioral problems, confusion, hallucinations, self-injury, sleep disturbance and suicidal ideas.           Objective:    VITALS  BP Readings from Last 1 Encounters:   01/09/23 117/82   Weight: 94.8 kg (209 lb)   Height: 5' 11" (180.3 cm)   BMI Readings from Last 1 Encounters:   01/09/23 29.15 kg/m²     No LMP for male patient.     Physical Exam  Vitals and nursing note reviewed.   Constitutional:       General: He is not in acute distress.     Appearance: Normal appearance. He is not ill-appearing, toxic-appearing or diaphoretic.   HENT:      Head: Normocephalic and atraumatic.      Right Ear: External ear normal.      Left Ear: External ear normal.      Nose: Nose normal.   Eyes:      General:         Right eye: No discharge.         Left eye: No discharge.   Neck:      Thyroid: No thyroid mass, thyromegaly or thyroid tenderness.      Vascular: No carotid bruit.   Cardiovascular:      Rate and Rhythm: Normal rate and regular rhythm.      Heart sounds: Normal heart sounds. No murmur heard.    No friction rub. No gallop.   Pulmonary:      Effort: Pulmonary effort is normal. No respiratory distress.      Breath sounds: Normal breath sounds. No stridor. No wheezing, rhonchi or rales.   Chest:      Chest wall: No tenderness.   Musculoskeletal:         General: Normal range of motion.      Cervical back: Normal range of motion and neck supple.      Right lower leg: No edema.      Left lower leg: No edema.   Skin:     General: Skin is warm and dry.      Coloration: Skin is not jaundiced or pale.   Neurological:      General: No focal deficit present.      Mental Status: He is alert and oriented to person, place, and time.      Gait: Gait normal.   Psychiatric:         Mood and Affect: Mood normal.         Behavior: Behavior normal.         Thought Content: Thought content normal.         Judgment: Judgment normal.          Assessment:        1. Preoperative examination     "     Cardiographics  ECG: normal sinus rhythm, no blocks or conduction defects, no ischemic changes  Echocardiogram: not done    Imaging  Chest x-ray: normal     Lab Review   Within normal limits      Assessment:        42 y.o. male with planned surgery as above.    Known risk factors for perioperative complications: None    Difficulty with intubation is not anticipated.     Plan:      1. Preoperative workup as follows chest x-ray, ECG, hemoglobin, hematocrit, electrolytes, creatinine, glucose, liver function studies, coagulation studies.  2. Change in medication regimen before surgery: discontinue Naproxen.  3. Invasive hemodynamic monitoring perioperatively: at the discretion of anesthesiologist.  4. Deep vein thrombosis prophylaxis postoperatively:regimen to be chosen by surgical team.  5. Surveillance for postoperative MI with ECG immediately postoperatively and on postoperative days 1 and 2 AND troponin levels 24 hours postoperatively and on day 4 or hospital discharge (whichever comes first): at the discretion of anesthesiologist.  6. Other measures:  none